# Patient Record
Sex: MALE | Race: WHITE | NOT HISPANIC OR LATINO | Employment: FULL TIME | URBAN - METROPOLITAN AREA
[De-identification: names, ages, dates, MRNs, and addresses within clinical notes are randomized per-mention and may not be internally consistent; named-entity substitution may affect disease eponyms.]

---

## 2018-07-06 ENCOUNTER — TRANSCRIBE ORDERS (OUTPATIENT)
Dept: ADMINISTRATIVE | Facility: HOSPITAL | Age: 38
End: 2018-07-06

## 2018-07-06 DIAGNOSIS — R20.0 NUMBNESS: Primary | ICD-10-CM

## 2018-07-06 DIAGNOSIS — R53.1 WEAKNESS: ICD-10-CM

## 2018-07-06 DIAGNOSIS — R74.8 ELEVATED CPK: ICD-10-CM

## 2018-07-11 ENCOUNTER — HOSPITAL ENCOUNTER (OUTPATIENT)
Dept: INFUSION THERAPY | Facility: HOSPITAL | Age: 38
Discharge: HOME | End: 2018-07-11
Attending: PHYSICAL MEDICINE & REHABILITATION | Admitting: PHYSICAL MEDICINE & REHABILITATION

## 2018-07-11 DIAGNOSIS — R74.8 ELEVATED CPK: ICD-10-CM

## 2018-07-11 DIAGNOSIS — R20.0 NUMBNESS: ICD-10-CM

## 2018-07-11 DIAGNOSIS — R53.1 WEAKNESS: ICD-10-CM

## 2018-07-11 PROCEDURE — 95909 NRV CNDJ TST 5-6 STUDIES: CPT

## 2018-07-11 PROCEDURE — 95886 MUSC TEST DONE W/N TEST COMP: CPT

## 2018-07-11 NOTE — PROCEDURES
HISTORY:     The patient is a 38-year-old male who presents with a complaint of numbness and tingling and intermittent muscle cramps of both lower extremities (right > left).  He states that he has been experiencing numbness and tingling since 2011.  He gives an 18-month history of muscle cramps.  He denies weakness.    The patient reports occasional pain in the low back region.  He has undergone 2 surgical procedures involving the thoracolumbar spine.  He underwent L5-S1 discectomy in 2011.  He underwent thoracic fusion in 2013.        I. NERVE CONDUCTION STUDIES:       The distal latency of the left peroneal motor nerve is 4.6 ms. The amplitude of evoked response is 4.4 mV. The conduction velocity is 44 m/sec.       The distal latency of the left tibial motor nerve is 4.3 ms. The amplitude of evoked response is 5.3 mV. The conduction velocity is 42 m/sec.      The distal latency of the left sural sensory nerve is 2.9 ms. The amplitude of evoked response is 12 microvolts (normal > 15 uV).       The distal latency of the right peroneal motor nerve is 4.2 ms. The amplitude of evoked response is 2.9 mV.  The conduction is 42 m/sec.      The distal latency of the right tibial motor nerve is 4.3 ms. The amplitude of evoked response is 5.9 mV. The conduction velocity is 45 m/sec.      The distal latency of the right sural sensory nerve is 2.8 ms. The amplitude of evoked response is 12 microvolts (normal > 15 uV).       II. ELECTROMYOGRAPHY:       Electromyography was performed on the following muscles of the right lower extremity using a monopolar needle: Vastus lateralis, tibialis anterior, peroneus longus, medial and lateral gastrocnemius, and bicep femoris. Because of her history of back surgery, the lumbar paraspinal muscles were not examined.      There were no changes in insertional activity. There were no denervation potentials present.       Increased polyphasic MUAPs were present in the right tibialis  anterior, medial gastrocnemius, and bicep femoris.     III. IMPRESSION:      1. Nerve conduction studies revealed a mild decrease in amplitude of the sural sensory evoked response bilaterally.  Otherwise, normal nerve conduction studies.       2. 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 There is no electrophysiologic evidence of an acute neuropathic process.      3. There is no electrophysiologic evidence of a myopathic process.       Carmen English M.D.*

## 2023-04-18 ENCOUNTER — OFFICE VISIT (OUTPATIENT)
Dept: BEHAVIORAL HEALTH | Facility: CLINIC | Age: 43
End: 2023-04-18
Payer: COMMERCIAL

## 2023-04-18 VITALS
BODY MASS INDEX: 38.05 KG/M2 | SYSTOLIC BLOOD PRESSURE: 134 MMHG | HEIGHT: 75 IN | HEART RATE: 100 BPM | DIASTOLIC BLOOD PRESSURE: 93 MMHG | WEIGHT: 306 LBS

## 2023-04-18 DIAGNOSIS — F41.0 GENERALIZED ANXIETY DISORDER WITH PANIC ATTACKS: ICD-10-CM

## 2023-04-18 DIAGNOSIS — F41.1 GENERALIZED ANXIETY DISORDER WITH PANIC ATTACKS: ICD-10-CM

## 2023-04-18 DIAGNOSIS — F33.1 MAJOR DEPRESSIVE DISORDER, RECURRENT EPISODE, MODERATE: Primary | ICD-10-CM

## 2023-04-18 PROBLEM — Z86.0100 HISTORY OF COLONIC POLYPS: Status: ACTIVE | Noted: 2020-08-19

## 2023-04-18 PROBLEM — F41.9 ANXIETY: Status: ACTIVE | Noted: 2021-12-20

## 2023-04-18 PROBLEM — F32.A DEPRESSIVE DISORDER: Status: ACTIVE | Noted: 2021-12-20

## 2023-04-18 PROBLEM — R73.9 HYPERGLYCEMIA: Status: ACTIVE | Noted: 2020-08-19

## 2023-04-18 PROBLEM — M54.50 LOW BACK PAIN: Status: ACTIVE | Noted: 2023-04-18

## 2023-04-18 PROBLEM — Z86.010 HISTORY OF COLONIC POLYPS: Status: ACTIVE | Noted: 2020-08-19

## 2023-04-18 PROBLEM — M79.609 PAIN IN SOFT TISSUES OF LIMB: Status: ACTIVE | Noted: 2023-04-18

## 2023-04-18 PROBLEM — E78.5 HYPERLIPIDEMIA: Status: ACTIVE | Noted: 2020-08-19

## 2023-04-18 PROCEDURE — 90792 PSYCH DIAG EVAL W/MED SRVCS: CPT | Performed by: NURSE PRACTITIONER

## 2023-04-18 RX ORDER — DULOXETIN HYDROCHLORIDE 30 MG/1
90 CAPSULE, DELAYED RELEASE ORAL DAILY
COMMUNITY
End: 2023-04-18 | Stop reason: SDUPTHER

## 2023-04-18 RX ORDER — CLONAZEPAM 0.5 MG/1
0.5 TABLET ORAL AS NEEDED
COMMUNITY

## 2023-04-18 RX ORDER — HYDROXYZINE HYDROCHLORIDE 25 MG/1
25 TABLET, FILM COATED ORAL DAILY
COMMUNITY
End: 2023-04-18 | Stop reason: SDUPTHER

## 2023-04-18 RX ORDER — DULOXETIN HYDROCHLORIDE 30 MG/1
90 CAPSULE, DELAYED RELEASE ORAL EVERY MORNING
Qty: 270 CAPSULE | Refills: 1 | Status: SHIPPED | OUTPATIENT
Start: 2023-04-18 | End: 2023-10-15

## 2023-04-18 RX ORDER — ATORVASTATIN CALCIUM 20 MG/1
1 TABLET, FILM COATED ORAL
COMMUNITY
Start: 2023-03-20

## 2023-04-18 RX ORDER — HYDROXYZINE HYDROCHLORIDE 25 MG/1
25 TABLET, FILM COATED ORAL 3 TIMES DAILY PRN
Qty: 90 TABLET | Refills: 1 | Status: SHIPPED | OUTPATIENT
Start: 2023-04-18

## 2023-04-18 RX ORDER — ASPIRIN 81 MG/1
81 TABLET ORAL
COMMUNITY
End: 2023-04-18

## 2023-04-18 RX ORDER — AMLODIPINE BESYLATE 5 MG/1
5 TABLET ORAL
COMMUNITY
Start: 2022-06-24 | End: 2023-06-24

## 2023-04-18 RX ORDER — FLUOXETINE HYDROCHLORIDE 40 MG/1
80 CAPSULE ORAL
COMMUNITY
End: 2023-04-18

## 2023-04-18 NOTE — PATIENT INSTRUCTIONS
"1. Should you want to get in touch with your provider, DELMY Rodriguez, please contact MY Medical Assistant, Carlita, directly at [phone].  Recommend saving Carlita's direct number in phone as this is the PREFERRED & EASIEST way to get in contact with your provider.  Please leave a voice mail if you do not get an answer and she will return your call within 24 hrs. You will NOT be able to contact provider on [facility], as Behavioral Health Providers are restricted. YOU MUST CALL [phone]    If you need to speak with the on call provider after hours or on weekends, please Contact the [facility] ([phone]) and staff will be able to page the provider on call directly.      2.  In the event you need to cancel an appointment, please notify the office at least 24 hrs prior:   Contact **Carlita Medical Assistant at [facility] directly at [phone] or the [facility] ([phone])       3, MEDICATION REFILLS:  PLEASE CALL THE PHARMACY TO REQUEST ALL MEDICATION REFILLS TO ENSURE YOU ARE RECEIVING YOUR MEDICATIONS IN A TIMELY MANNER. The pharmacy will send this request ELECTRONICALLY to the ordering provider.     IF YOU USE AN AUTOMATED SERVICE AT THE PHARMACY FOR REFILLS AND ARE TOLD THERE ARE \"NO REFILLS REMAINING\"   PLEASE CALL THE PHARMACY & SPEAK TO A LIVE PERSON TO VERIFY IT IS THE MOST UP TO DATE PRESCRIPTION ON FILE.    All new prescriptions will have a different number, therefore, if you were given refills for a medication today or at last visit it will not have the same number as the previous prescription.       4.  In the event you have personal crisis, contact the following crisis numbers: Suicide Prevention Hotline 1-619.125.4285 or *988, [facility] Helpline 6-971-358-NERIS; [facility] Emergency Room [phone]; text HELLO to 071862; or 510.      5. We would appreciate your feedback, please scan the QRS code on the back of your appointment card (or see below) and complete " "a brief survey.  [place] location is still not available, so please click \"[place]\" location.  Thank you      SPECIFIC RECOMMENDATIONS:     1.      Medications discussed at this encounter:                   - STOP Zoloft (SERTRALINE) you may experience some irritability, nausea, diarrhea due to stopping, though should subside within a few days.     Removed Prozac from medication profile    Change Hydroxyzine from Vistaril(capsule you have at home) TO tablet form 25 mg by mouth 3 times daily as needed anxiety. Instructed to not take any other Antihistamine/Allergy medication such as but not limited to:  Zyrtec, Claritin, Allegra, or Benadryl within 4 hrs of taking Hydroxyzine.  *Filled today     Start keeping some of the Hydroxyzine in your truck to help with anxiety if needed instead of Klonopin.     Continue Cymbalta 90 mg by mouth daily in the morning, *refilled today.      2.      Psychotherapy recommendations: Continue with current therapist. Declined     3.     Return to clinic: 6 weeks    Coping mechanisms discussed with patient today as a way to gain control over feelings to prevent situation or panic attack from getting worse: grounding techniques using 5 senses (name 3 things you can see, smell, touch, etc.), slow paced breathing techniques- focus on door inhaling and exhaling at each corner, application of cold compress/ice pack/water on face or opening freezer door to allow cold air to be breathed in taking slow deep breaths, closing eyes and focus on positive thoughts.     Please arrive at least 15 minutes before your scheduled appointment time to complete check in process.      IF you are scheduled for a [facility] VIDEO visit, YOU MUST COMPLETE THE \"E-CHECK IN\" PROCESS PRIOR TO BEGINNING THE VISIT, YOU WILL NO LONGER RECEIVE A PHONE CALL PRIOR TO ALL VIDEO VISITS          "

## 2023-04-18 NOTE — PROGRESS NOTES
"Subjective   Naldo Womack is a 43 y.o. male who presents today for initial evaluation     Referring Provider:  Torrie Soriano PA  [address]  [address] [place] [address]    Chief Complaint:  Depression and anxiety    History of Present Illness:  Patient presents today in office with a history of anxiety and depression for which treatment began approximately 20 yrs ago.  Patient is currently prescribed Cymbalta 90 mg, Zoloft 50 mg, and Hydroxyzine (Vistaril) 25 mg daily, which have provided effectiveness.  Patient has a PMHX of Hypertension, Migraines, ANGELICA with use of CPAP, hypogonadism, ED, prostate disorder, Arthritis, gout, lumbar back surgery, peripheral neuropathy (BLE) and allergic rhinitis.    Patient goal of treatment \"just to feel somewhat normal, and have some emotion, right now I feel I am just here.\"    Patient was with an [facility] provider for approximately 1.5 yrs, last seen via video approximately 1 month ago, was not happy with care due to frequent medication changes.  As patient reports taking 4 different medications at the same time, and is unable to determine their effectiveness.     Patient reports having thoughts of a truck hitting him while driving, feels worthless at times.  Denies suicidal planning, thoughts of action plan, or rumination.  \"That family would be better off dead, negative thoughts start to spiral through, and over thinks meaning how much better would my family be if this truck lost control.\"  Once patient is around his 3 kids or is busy at work he is able to alleviate negative thoughts.  These thoughts have occurred once every several months.      Wife tells patient he is emotionless, which has been going on for approximately 6-8 months.  Patient recalls while family was bowling, family was having fun, showing emotion, though patient had no expression or emotion to surroundings.      Patient is certain he is taking Cymbalta 90 mg in the morning, Hydroxyzine 25 mg daily " "in the morning is allowed to take up to 3 times daily,  Zoloft 50 mg in the morning.  The Klonopin 0.5 mg was filled 22 for 15 tabs, and last took yesterday due to elevated stress and prior was at least 2-3 months.  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      In regards to anxiety, \"it is out of nowhere\", will feel discomfort to chest, anxiousness, feeling soa, which is typically felt after arrival to work which is approximately 10 minutes after arising. Though has felt symptoms in the past without being work related.  Patient unable to identify specific triggers of anxiety.  Patient will try to calm self by listening to music alone in truck, distractions which seems to help if anxiety is not as bad.   And if not relieved will only take half of the Klonopin 0.5 mg which is effective, as patient try's to not take unless absolutely necessary. Patient believes he has 8-9 tabs of Klonopin remaining.  Patient denies use of as needed Hydroxyzine in place of Klonopin in the past.     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 Patient does suffer from arthritis. At times patient does experience blurred vision, which is intermittent. Denies use of prescription lenses or contacts or recent eye exam. Left hand does shake with fine motor skills such as writing, which patient recently noticed over the last few months.     Cymbalta for approximately 3 yrs, had been effective, however, per pharmacy record patient provided today, dose was increased from 60 mg to 90 mg 22.  And Zoloft 50 mg started 22.  Nerve pain to ble has been managed well with Cymbalta.  Patient occasionally takes another antihistamine a few weeks ago due to allergy season, otherwise does not take " routinely or past start of spring.  Denies difficulty with sleep since starting using CPAP.  Denies day time drowsiness.  Confirmed patient is no longer taking Prozac, Rexulti, Buspar, or Effexor XR.      Depression: Patient complains of depression. He complains of anhedonia, depressed mood, difficulty concentrating, feelings of worthlessness/guilt and appetite changes.        Anxiety: The patient endorses significant symptoms of anxiety including: anxioiusness, feeling on edge, , restlessness or feeling keyed up, being easily fatigued, difficulty concentrating or mind going blank, irritability, muscle tension and sleep disturbance which have caused impairment in important areas of daily functioning.  The patient has had symptoms of anxiety for over 20 yrs, which have worsened over the last 8-9 months.       PHQ-9 Depression Screening  PHQ-9 Total Score: 7    Little interest or pleasure in doing things? 1-->several days   Feeling down, depressed, or hopeless? 1-->several days   Trouble falling or staying asleep, or sleeping too much? 0-->not at all   Feeling tired or having little energy? 0-->not at all   Poor appetite or overeating? 1-->several days   Feeling bad about yourself - or that you are a failure or have let yourself or your family down? 1-->several days   Trouble concentrating on things, such as reading the newspaper or watching television? 1-->several days   Moving or speaking so slowly that other people could have noticed? Or the opposite - being so fidgety or restless that you have been moving around a lot more than usual? 1-->several days   Thoughts that you would be better off dead, or of hurting yourself in some way? 1-->several days   PHQ-9 Total Score 7     BERNICE-7  Feeling nervous, anxious or on edge: Several days  Not being able to stop or control worrying: Several days  Worrying too much about different things: Several days  Trouble Relaxing: Several days  Being so restless that it is hard to sit  still: Several days  Feeling afraid as if something awful might happen: Not at all  Becoming easily annoyed or irritable: Several days  BERNICE 7 Total Score: 6  If you checked any problems, how difficult have these problems made it for you to do your work, take care of things at home, or get along with other people: Very difficult    Past Surgical History:  Past Surgical History:   Procedure Laterality Date   • BACK SURGERY      x 2       Problem List:  Patient Active Problem List   Diagnosis   • Anxiety   • Depressive disorder   • History of colonic polyps   • Hyperglycemia   • Hyperlipidemia   • Low back pain   • Pain in soft tissues of limb   • Thoracic spondylosis with myelopathy       Allergy:   No Known Allergies     Discontinued Medications:  Medications Discontinued During This Encounter   Medication Reason   • aspirin 81 MG EC tablet *Therapy completed   • FLUoxetine (PROzac) 40 MG capsule Historical Med - Therapy completed   • sertraline (ZOLOFT) 50 MG tablet Drug interaction   • DULoxetine (CYMBALTA) 30 MG capsule Reorder   • hydrOXYzine (ATARAX) 25 MG tablet Reorder       Current Medications:   Current Outpatient Medications   Medication Sig Dispense Refill   • amLODIPine (NORVASC) 5 MG tablet Take 1 tablet by mouth.     • atorvastatin (LIPITOR) 20 MG tablet Take 1 tablet by mouth every night at bedtime.     • clonazePAM (KlonoPIN) 0.5 MG tablet Take 1 tablet by mouth As Needed for Anxiety (for Anxiety attacks).     • DULoxetine (CYMBALTA) 30 MG capsule Take 3 capsules by mouth Every Morning for 180 days. Indications: Disease of the Peripheral Nerves, Generalized Anxiety Disorder, Major Depressive Disorder 270 capsule 1   • hydrOXYzine (ATARAX) 25 MG tablet Take 1 tablet by mouth 3 (Three) Times a Day As Needed for Anxiety. Indications: Feeling Anxious 90 tablet 1     No current facility-administered medications for this visit.       Past Medical History:  Past Medical History:   Diagnosis Date   • Anxiety   "  • Chronic pain disorder    • Depression    • Obsessive-compulsive disorder    • Panic disorder    • Peripheral neuropathy        Past Psychiatric History:  Began Treatment:20 yrs ago prior to getting , though dealt with symptoms since childhood  Diagnoses:Depression, Anxiety and OCD, panic disorder  Psychiatrist:DELMY Alamo with [facility] 1.5 yrs, last seen 3/2022  Therapist:2- one had about 6 visits approx 15 yrs ago; and 8 yrs ago seen a lady in [place]  Admission History:Denies  Medication Trials:several changes while at [facility], \"certain one's i took in the morning and i couldn't get out of truck, like i was froze, worrying\"added several meds to Cymbalta; Effexor XR, Buspar was unable to keep up with overlapping med orders; Rexulti, Prozac, and Zoloft (9/2022-4/18/23 stopped due to risk of serotonin syndrome wihile taking with Cymbalta)  Self Harm: Denies  Suicide Attempts:Denies      Substance Abuse History:   Types:Denies all, including illicit  Withdrawal Symptoms:Denies  Longest Period Sober:Not Applicable   AA: Not applicable     Social History:  Martial Status:  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, where [employer] [place] [employer]   Kids:Yes or If so, how many 3- age 13,10, and 7  House:Lives in a house   History: Denies  Access to Guns:  Yes, locked in safe    Social History     Socioeconomic History   • Marital status:    Tobacco Use   • Smoking status: Never   • Smokeless tobacco: Current     Types: Snuff   Vaping Use   • Vaping Use: Never used   Substance and Sexual Activity   • Drug use: Never     Comment: only anything ever prescribed (but did one time fail drug test due to CBD drops not using currently)   • Sexual activity: Yes       Family History:   Suicide Attempts: Denies  Suicide Completions:Denies      Family History   Problem Relation Age of Onset   • Depression Mother    • Anxiety disorder Mother    • Alcohol abuse Father    • Alcohol abuse Sister    • Alcohol abuse " Brother        Developmental History:   Born: [place]  Siblings:3 sisters, 1 brother  Childhood: Denies Abuse  High School:Completed  College:[employer] degree    Mental Status Exam:   Hygiene:   good  Cooperation:  Cooperative  Eye Contact:  Good  Psychomotor Behavior:  Appropriate  Affect:  Appropriate  Mood: depressed and anxious  Speech:  Normal  Thought Process:  Goal directed  Thought Content:  Mood congruent  Suicidal:  None  Homicidal:  None  Hallucinations:  None  Delusion:  None  Memory:  Intact  Orientation:  Grossly intact  Reliability:  good  Insight:  Good  Judgement:  Good  Impulse Control:  Good  Physical/Medical Issues:  Yes Hypertension, Migraines, ANGELICA with CPAP, peripheral neuropathy, ED, Hypogonadism, prostate disorder, Arthritis, gout, lumbar back surgery, and allergic rhinitis.     Review of Systems:  Review of Systems   Constitutional: Positive for appetite change. Negative for fatigue.   HENT: Negative for trouble swallowing.    Respiratory: Positive for apnea. Negative for cough and shortness of breath.         CPAP   Cardiovascular: Negative for chest pain and palpitations.   Gastrointestinal: Negative for diarrhea and nausea.   Musculoskeletal: Positive for arthralgias and back pain.   Neurological: Positive for numbness. Negative for seizures.        Ble peripheral neuropathy   Psychiatric/Behavioral: Positive for decreased concentration. Negative for agitation, confusion, hallucinations, self-injury, sleep disturbance and suicidal ideas. The patient is nervous/anxious. The patient is not hyperactive.          Physical Exam:  Physical Exam  Psychiatric:         Attention and Perception: Attention and perception normal.         Mood and Affect: Affect normal. Mood is anxious and depressed.         Speech: Speech normal.         Behavior: Behavior normal. Behavior is cooperative.         Thought Content: Thought content normal. Thought content does not include suicidal ideation.  "Thought content does not include suicidal plan.         Cognition and Memory: Cognition and memory normal.         Judgment: Judgment normal.         Vital Signs:   /93   Pulse 100   Ht 190.5 cm (75\")   Wt (!) 139 kg (306 lb)   BMI 38.25 kg/m²      Lab Results:   No visits with results within 6 Month(s) from this visit.   Latest known visit with results is:   No results found for any previous visit.       EKG Results:  No orders to display       Imaging Results:  No Images in the past 120 days found..      Assessment & Plan   Diagnoses and all orders for this visit:    1. Major depressive disorder, recurrent episode, moderate (Primary)  -     DULoxetine (CYMBALTA) 30 MG capsule; Take 3 capsules by mouth Every Morning for 180 days. Indications: Disease of the Peripheral Nerves, Generalized Anxiety Disorder, Major Depressive Disorder  Dispense: 270 capsule; Refill: 1    2. Generalized anxiety disorder with panic attacks  -     hydrOXYzine (ATARAX) 25 MG tablet; Take 1 tablet by mouth 3 (Three) Times a Day As Needed for Anxiety. Indications: Feeling Anxious  Dispense: 90 tablet; Refill: 1  -     DULoxetine (CYMBALTA) 30 MG capsule; Take 3 capsules by mouth Every Morning for 180 days. Indications: Disease of the Peripheral Nerves, Generalized Anxiety Disorder, Major Depressive Disorder  Dispense: 270 capsule; Refill: 1        Visit Diagnoses:    ICD-10-CM ICD-9-CM   1. Major depressive disorder, recurrent episode, moderate  F33.1 296.32   2. Generalized anxiety disorder with panic attacks  F41.1 300.02    F41.0 300.01       PLAN:  1. Safety: No acute safety concerns  2. Therapy: Declines due to inconsistent work schedule  3. Risk Assessment: Risk of self-harm acutely is moderate.  Risk factors include anxiety disorder, mood disorder, access to guns/weapons.  Protective factors include no family history, no present SI, no history of suicide attempts or self-harm in the past, minimal AODA, healthcare seeking, " future orientation, willingness to engage in care.  Risk of self-harm chronically is also moderate, but could be further elevated in the event of treatment noncompliance and/or AODA.  4. Meds:  Continue Cymbalta (Duloxetine) 90 mg by mouth daily in the morning to target depression and anxiety as well as peripheral neuropathy.   Discussed all risks, benefits, alternatives, and side effects of Duloxetine (Cymbalta) including but not limited to GI upset, sexual dysfunction, bleeding risk, seizure risk, weight loss, insomnia, diaphoresis, drowsiness, headache, dizziness, fatigue, activation of kimmie or hypomania, increased fragility fracture risk, hyponatremia, increased BP, hepatotoxicity, ocular effects, withdrawal syndrome following abrupt discontinuation, serotonin syndrome, and activation of suicidal ideation and behavior.   Discussed the need for patient to immediately call the office for any new or worsening symptoms, such as worsening depression; feeling nervous or restless; suicidal thoughts or actions; or other changes in mood or behavior, and all other concerns. Patient educated on medication compliance and the risks of suddenly stopping this medication or missing doses. Patient verbalized understanding and is agreeable to taking Duloxetine. Addressed all questions and concerns.    Change Hydroxyzine (Vistaril) 25 mg by mouth daily TO Hydroxyzine (Atarax) 25 mg by mouth 3 times daily as needed to target anxiety, panic attacks due to increased risks of sedation with Vistaril.  Instructed to not take any other Antihistamine/Allergy medication such as but not limited to:  Zyrtec, Claritin, Allegra, or Benadryl within 4 hrs of taking Hydroxyzine.  Risks, benefits, alternatives discussed with patient including sedation, dizziness, fall risk, GI upset, and risk of increased CNS depression and elevated heart rate if taken with other antihistamines.  After discussion of these risks and benefits, the patient voiced  understanding and agreed to proceed. Instructed patient to start keeping a small supply of Hydroxyzine in truck instead of Klonopin, which patient reported rare use.  Advised to no longer take Klonopin if Hydroxyzine effective along with coping strategies discussed today.     Stop Zoloft due to risks of serotonin syndrome in combination with Cymbalta, informed patient he may experience increased irritability and GI discomfort which should subside in a few days. If symptoms fail to improve contact provider.     Removed Prozac from medication profile as patient has not taken for some time.     Patient instructed to bring AVS with current medication list to pharmacy upon picking up Cymbalta and Hydroxyzine for accuracy. Highlighted area to show pharmacy.     5. Labs: n/a  6. Coping mechanisms discussed with patient today as a way to gain control over feelings to prevent situation or panic attack from getting worse: grounding techniques using 5 senses (name 3 things you can see, smell, touch, etc.), slow paced breathing techniques- focus on door inhaling and exhaling at each corner, application of cold compress/ice pack/water on face or opening freezer door to allow cold air to be breathed in taking slow deep breaths, closing eyes and focus on positive thoughts.      Patient presentation seems most consistent with depression, anxiety, and panic attacks.  Reviewed medication dispense record from [facility] pharmacy 1/1/21-12/31/22, reconciled medications.   Patient to contact provider if symptoms worsen or fail to improve.      Patient screened positive for depression based on a PHQ-9 score of 7 on 4/18/2023. Follow-up recommendations include: Prescribed antidepressant medication treatment and Suicide Risk Assessment performed.         TREATMENT PLAN/GOALS: Continue supportive psychotherapy efforts and medications as indicated. Treatment and medication options discussed during today's visit. Patient acknowledged and verbally  consented to continue with current treatment plan and was educated on the importance of compliance with treatment and follow-up appointments.    MEDICATION ISSUES:  HANSEL reviewed as expected.  Discussed medication options and treatment plan of prescribed medication as well as the risks, benefits, and side effects including potential falls, possible impaired driving and metabolic adversities among others. Patient is agreeable to call the office with any worsening of symptoms or onset of side effects. Patient is agreeable to call 911 or go to the nearest ER should he/she begin having SI/HI. No medication side effects or related complaints today.     MEDS ORDERED DURING VISIT:  New Medications Ordered This Visit   Medications   • hydrOXYzine (ATARAX) 25 MG tablet     Sig: Take 1 tablet by mouth 3 (Three) Times a Day As Needed for Anxiety. Indications: Feeling Anxious     Dispense:  90 tablet     Refill:  1   • DULoxetine (CYMBALTA) 30 MG capsule     Sig: Take 3 capsules by mouth Every Morning for 180 days. Indications: Disease of the Peripheral Nerves, Generalized Anxiety Disorder, Major Depressive Disorder     Dispense:  270 capsule     Refill:  1     Patient is no longer taking Zoloft       Return in about 6 weeks (around 5/30/2023) for medication check.         I spent 78 minutes caring for Naldo on this date of service. This time includes time spent by me in the following activities: preparing for the visit, reviewing tests, obtaining and/or reviewing a separately obtained history, performing a medically appropriate examination and/or evaluation, counseling and educating the patient/family/caregiver, ordering medications, tests, or procedures, referring and communicating with other health care professionals, documenting information in the medical record and care coordination.      This document has been electronically signed by DELMY Rodriguez  April 18, 2023 11:49 EDT      Part of this note may be an  electronic transcription/translation of spoken language to printed text using the Dragon Dictation System.

## 2023-05-30 ENCOUNTER — OFFICE VISIT (OUTPATIENT)
Dept: BEHAVIORAL HEALTH | Facility: CLINIC | Age: 43
End: 2023-05-30

## 2023-05-30 VITALS
SYSTOLIC BLOOD PRESSURE: 151 MMHG | HEART RATE: 73 BPM | BODY MASS INDEX: 37.28 KG/M2 | WEIGHT: 299.8 LBS | HEIGHT: 75 IN | DIASTOLIC BLOOD PRESSURE: 86 MMHG

## 2023-05-30 DIAGNOSIS — F41.0 GENERALIZED ANXIETY DISORDER WITH PANIC ATTACKS: ICD-10-CM

## 2023-05-30 DIAGNOSIS — F41.1 GENERALIZED ANXIETY DISORDER WITH PANIC ATTACKS: ICD-10-CM

## 2023-05-30 DIAGNOSIS — F33.1 MAJOR DEPRESSIVE DISORDER, RECURRENT EPISODE, MODERATE: Primary | ICD-10-CM

## 2023-05-30 PROCEDURE — 99214 OFFICE O/P EST MOD 30 MIN: CPT | Performed by: NURSE PRACTITIONER

## 2023-05-30 NOTE — PROGRESS NOTES
"Subjective   Naldo Womack is a 43 y.o. male who presents today for follow up    Referring Provider:  Torrie Soriano PA  [address]  [place] [address]    Chief Complaint:  Depression and anxiety, panic attacks    History of Present Illness:    5/30/23:  Patient presents today in office, \"The first 3 weeks coming off that medicine I had angry spells, up and down, but the last 2-3 days I have been crying for no reason.\" Patient was taken off Zoloft 50 mg at last visit due to also taking Cymbalta 90 mg.     At last visit Hydroxyzine (Vistaril) was changed from 25 mg by mouth daily TO Hydroxyzine (Atarax) 25 mg by mouth 3 times daily as needed to target anxiety, panic attacks due to increased risks of sedation with Vistaril.  Patient was also instructed patient to start keeping a small supply of Hydroxyzine in truck instead of Klonopin, which patient reported rare use.  Advised to no longer take Klonopin if Hydroxyzine effective along with coping strategies discussed today.   Patient has not required use as as needed, and takes daily in the morning,and keeps extra in vehicle which patient has not had to take.  Denies drowsiness.     Panic attacks: denies  Patient weight noted with a decrease of 7 lbs, however, patient reports weight fluctuates often, and believes prior weight may have been up due to work clothes and pockets with items.     Patient no longer having anger spells, currently feelings of emptiness. Denies external stressors or changes.  This past Saturday went out with family, and Sunday night went to a concert, which patient found guillermo.  \"It comes out of nowhere, it hits me all at once.\"  Patient will try to distract self rather than dwell, though sometimes unable to distract.  Reports more sadness, hopeless feeling, and starts to cry, rather than anxiousness.        4/18/23:  INITIAL EVAL  Patient presents today in office with a history of anxiety and depression for which treatment " "began approximately 20 yrs ago.  Patient is currently prescribed Cymbalta 90 mg, Zoloft 50 mg, and Hydroxyzine (Vistaril) 25 mg daily, which have provided effectiveness.  Patient has a PMHX of Hypertension, Migraines, ANGELICA with use of CPAP, hypogonadism, ED, prostate disorder, Arthritis, gout, lumbar back surgery, peripheral neuropathy (BLE) and allergic rhinitis.    Patient goal of treatment \"just to feel somewhat normal, and have some emotion, right now I feel I am just here.\"    Patient was with an [facility] provider for approximately 1.5 yrs, last seen via video approximately 1 month ago, was not happy with care due to frequent medication changes.  As patient reports taking 4 different medications at the same time, and is unable to determine their effectiveness.     Patient reports having thoughts of a truck hitting him while driving, feels worthless at times.  Denies suicidal planning, thoughts of action plan, or rumination.  \"That family would be better off dead, negative thoughts start to spiral through, and over thinks meaning how much better would my family be if this truck lost control.\"  Once patient is around his 3 kids or is busy at work he is able to alleviate negative thoughts.  These thoughts have occurred once every several months.      Wife tells patient he is emotionless, which has been going on for approximately 6-8 months.  Patient recalls while family was bowling, family was having fun, showing emotion, though patient had no expression or emotion to surroundings.      Patient is certain he is taking Cymbalta 90 mg in the morning, Hydroxyzine 25 mg daily in the morning is allowed to take up to 3 times daily,  Zoloft 50 mg in the morning.  The Klonopin 0.5 mg was filled 22 for 15 tabs, and last took yesterday due to elevated stress and prior was at least 2-3 months.  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      In regards to anxiety, \"it is out of nowhere\", will feel discomfort to chest, anxiousness, feeling soa, which is typically felt after arrival to work which is approximately 10 minutes after arising. Though has felt symptoms in the past without being work related.  Patient unable to identify specific triggers of anxiety.  Patient will try to calm self by listening to music alone in truck, distractions which seems to help if anxiety is not as bad.   And if not relieved will only take half of the Klonopin 0.5 mg which is effective, as patient try's to not take unless absolutely necessary. Patient believes he has 8-9 tabs of Klonopin remaining.  Patient denies use of as needed Hydroxyzine in place of Klonopin in the past.     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 Patient does suffer from arthritis. At times patient does experience blurred vision, which is intermittent. Denies use of prescription lenses or contacts or recent eye exam. Left hand does shake with fine motor skills such as writing, which patient recently noticed over the last few months.     Cymbalta for approximately 3 yrs, had been effective, however, per pharmacy record patient provided today, dose was increased from 60 mg to 90 mg 8/30/22.  And Zoloft 50 mg started 9/19/22.  Nerve pain to ble has been managed well with Cymbalta.  Patient occasionally takes another antihistamine a few weeks ago due to allergy season, otherwise does not take routinely or past start of spring.  Denies difficulty with sleep since starting using CPAP.  Denies day time drowsiness.  Confirmed patient is no longer taking Prozac, Rexulti, Buspar, or Effexor XR.      Depression: Patient complains of depression. He complains of anhedonia, depressed mood, difficulty concentrating, feelings of worthlessness/guilt " and appetite changes.        Anxiety: The patient endorses significant symptoms of anxiety including: anxioiusness, feeling on edge, , restlessness or feeling keyed up, being easily fatigued, difficulty concentrating or mind going blank, irritability, muscle tension and sleep disturbance which have caused impairment in important areas of daily functioning.  The patient has had symptoms of anxiety for over 20 yrs, which have worsened over the last 8-9 months.       PHQ-9 Depression Screening  PHQ-9 Total Score:   4/18/2023 7 , reassess 08/2023    Little interest or pleasure in doing things?     Feeling down, depressed, or hopeless?     Trouble falling or staying asleep, or sleeping too much?     Feeling tired or having little energy?     Poor appetite or overeating?     Feeling bad about yourself - or that you are a failure or have let yourself or your family down?     Trouble concentrating on things, such as reading the newspaper or watching television?     Moving or speaking so slowly that other people could have noticed? Or the opposite - being so fidgety or restless that you have been moving around a lot more than usual?     Thoughts that you would be better off dead, or of hurting yourself in some way?     PHQ-9 Total Score       BERNICE-7    4/18/2023  6, reassess 08/2023    Past Surgical History:  Past Surgical History:   Procedure Laterality Date   • BACK SURGERY      x 2       Problem List:  Patient Active Problem List   Diagnosis   • Anxiety   • Depressive disorder   • History of colonic polyps   • Hyperglycemia   • Hyperlipidemia   • Low back pain   • Pain in soft tissues of limb   • Thoracic spondylosis with myelopathy       Allergy:   No Known Allergies     Discontinued Medications:  There are no discontinued medications.    Current Medications:   Current Outpatient Medications   Medication Sig Dispense Refill   • amLODIPine (NORVASC) 5 MG tablet Take 1 tablet by mouth.     • atorvastatin (LIPITOR) 20 MG  "tablet Take 1 tablet by mouth every night at bedtime.     • clonazePAM (KlonoPIN) 0.5 MG tablet Take 1 tablet by mouth As Needed for Anxiety (for Anxiety attacks).     • DULoxetine (CYMBALTA) 30 MG capsule Take 3 capsules by mouth Every Morning for 180 days. Indications: Disease of the Peripheral Nerves, Generalized Anxiety Disorder, Major Depressive Disorder 270 capsule 1   • hydrOXYzine (ATARAX) 25 MG tablet Take 1 tablet by mouth 3 (Three) Times a Day As Needed for Anxiety. Indications: Feeling Anxious 90 tablet 1     No current facility-administered medications for this visit.       Past Medical History:  Past Medical History:   Diagnosis Date   • Anxiety    • Chronic pain disorder    • Depression    • Obsessive-compulsive disorder    • Panic disorder    • Peripheral neuropathy        Past Psychiatric History:  Began Treatment:20 yrs ago prior to getting , though dealt with symptoms since childhood  Diagnoses:Depression, Anxiety and OCD, panic disorder  Psychiatrist:DELMY Alamo with [facility] 1.5 yrs, last seen 3/2022  Therapist:2- one had about 6 visits approx 15 yrs ago; and 8 yrs ago seen a lady in [place]  Admission History:Denies  Medication Trials:several changes while at [facility], \"certain one's i took in the morning and i couldn't get out of truck, like i was froze, worrying\"added several meds to Cymbalta; Effexor XR, Buspar was unable to keep up with overlapping med orders; Rexulti, Prozac, and Zoloft (9/2022-4/18/23 stopped due to risk of serotonin syndrome wihile taking with Cymbalta)  Self Harm: Denies  Suicide Attempts:Denies      Substance Abuse History:   Types:Denies all, including illicit  Withdrawal Symptoms:Denies  Longest Period Sober:Not Applicable   AA: Not applicable     Social History:  Martial Status:  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, where [employer] [place] [employer]   Kids:Yes or If so, how many 3- age 13,10, and 7  House:Lives in a house   History: Denies  Access to " Guns:  Yes, locked in safe    Social History     Socioeconomic History   • Marital status:    Tobacco Use   • Smoking status: Never   • Smokeless tobacco: Current     Types: Snuff   Vaping Use   • Vaping Use: Never used   Substance and Sexual Activity   • Drug use: Never     Comment: only anything ever prescribed (but did one time fail drug test due to CBD drops not using currently)   • Sexual activity: Yes       Family History:   Suicide Attempts: Denies  Suicide Completions:Denies      Family History   Problem Relation Age of Onset   • Depression Mother    • Anxiety disorder Mother    • Alcohol abuse Father    • Alcohol abuse Sister    • Alcohol abuse Brother        Developmental History:   Born: [place]  Siblings:3 sisters, 1 brother  Childhood: Denies Abuse  High School:Completed  College:[employer] degree    Mental Status Exam:   Hygiene:   good  Cooperation:  Cooperative  Eye Contact:  Good  Psychomotor Behavior:  Appropriate  Affect:  Appropriate  Mood: depressed and anxious  Speech:  Normal  Thought Process:  Goal directed  Thought Content:  Mood congruent  Suicidal:  None  Homicidal:  None  Hallucinations:  None  Delusion:  None  Memory:  Intact  Orientation:  Grossly intact  Reliability:  good  Insight:  Good  Judgement:  Good  Impulse Control:  Good  Physical/Medical Issues:  Yes Hypertension, Migraines, ANGELICA with CPAP, peripheral neuropathy, ED, Hypogonadism, prostate disorder, Arthritis, gout, lumbar back surgery, and allergic rhinitis.     Review of Systems:  Review of Systems   Constitutional: Positive for appetite change. Negative for fatigue.   HENT: Negative for trouble swallowing.    Respiratory: Positive for apnea. Negative for cough and shortness of breath.         CPAP   Cardiovascular: Negative for chest pain and palpitations.   Gastrointestinal: Negative for diarrhea and nausea.   Musculoskeletal: Positive for arthralgias and back pain.   Neurological: Positive for numbness.  "Negative for seizures.        Ble peripheral neuropathy   Psychiatric/Behavioral: Positive for decreased concentration. Negative for agitation, confusion, hallucinations, self-injury, sleep disturbance and suicidal ideas. The patient is nervous/anxious. The patient is not hyperactive.          Physical Exam:  Physical Exam  Psychiatric:         Attention and Perception: Attention and perception normal.         Mood and Affect: Affect normal. Mood is anxious and depressed.         Speech: Speech normal.         Behavior: Behavior normal. Behavior is cooperative.         Thought Content: Thought content normal. Thought content does not include suicidal ideation. Thought content does not include suicidal plan.         Cognition and Memory: Cognition and memory normal.         Judgment: Judgment normal.         Vital Signs:   /86   Pulse 73   Ht 190.5 cm (75\")   Wt 136 kg (299 lb 12.8 oz)   BMI 37.47 kg/m²      Lab Results:   No visits with results within 6 Month(s) from this visit.   Latest known visit with results is:   No results found for any previous visit.       EKG Results:  No orders to display       Imaging Results:  No Images in the past 120 days found..      Assessment & Plan   Diagnoses and all orders for this visit:    1. Major depressive disorder, recurrent episode, moderate (Primary)    2. Generalized anxiety disorder with panic attacks        Visit Diagnoses:    ICD-10-CM ICD-9-CM   1. Major depressive disorder, recurrent episode, moderate  F33.1 296.32   2. Generalized anxiety disorder with panic attacks  F41.1 300.02    F41.0 300.01       PLAN:  1. Safety: No acute safety concerns  2. Therapy: Declines due to inconsistent work schedule  3. Risk Assessment: Risk of self-harm acutely is moderate.  Risk factors include anxiety disorder, mood disorder, access to guns/weapons.  Protective factors include no family history, no present SI, no history of suicide attempts or self-harm in the past, " minimal AODA, healthcare seeking, future orientation, willingness to engage in care.  Risk of self-harm chronically is also moderate, but could be further elevated in the event of treatment noncompliance and/or AODA.  4. Meds:  Continue Cymbalta (Duloxetine) 90 mg by mouth daily in the morning to target depression and anxiety as well as peripheral neuropathy.   Discussed all risks, benefits, alternatives, and side effects of Duloxetine (Cymbalta) including but not limited to GI upset, sexual dysfunction, bleeding risk, seizure risk, weight loss, insomnia, diaphoresis, drowsiness, headache, dizziness, fatigue, activation of kimmie or hypomania, increased fragility fracture risk, hyponatremia, increased BP, hepatotoxicity, ocular effects, withdrawal syndrome following abrupt discontinuation, serotonin syndrome, and activation of suicidal ideation and behavior.   Discussed the need for patient to immediately call the office for any new or worsening symptoms, such as worsening depression; feeling nervous or restless; suicidal thoughts or actions; or other changes in mood or behavior, and all other concerns. Patient educated on medication compliance and the risks of suddenly stopping this medication or missing doses. Patient verbalized understanding and is agreeable to taking Duloxetine. Addressed all questions and concerns.    Continue Hydroxyzine (Atarax) 25 mg by mouth 3 times daily as needed to target anxiety, panic attacks. Instructed to not take any other Antihistamine/Allergy medication such as but not limited to:  Zyrtec, Claritin, Allegra, or Benadryl within 4 hrs of taking Hydroxyzine.  Risks, benefits, alternatives discussed with patient including sedation, dizziness, fall risk, GI upset, and risk of increased CNS depression and elevated heart rate if taken with other antihistamines.  After discussion of these risks and benefits, the patient voiced understanding and agreed to proceed.  Denies drowsiness with  once daily dose every morning, has not required more than once daily.    5. Labs: n/a    Patient instructed to Start tracking symptoms with date, time, symptoms experiencing, potential cause-what were you doing at the time of the sudden symptoms you experienced if you can recall.  How were you able to stop symptoms? And have wife check blood pressure at least once weekly.     Symptoms of anxiety, panic attacks, and depression are under fair control with current medication regimen.  Due to duration of current symptoms expressed today and elevated blood pressure, will have patient start tracking symptoms, have wife check blood pressure at least once weekly and report with next visit.  Patient advised to contact provider if symptoms are occurring more often than not, as we discussed adding Cymbalta 30 mg nightly, though would like to see blood pressure more stable and symptom tracking before proceeding, as patient was informed if blood pressure did continue to elevate would have to stop the 30 mg dose.   Patient to contact provider if symptoms worsen or fail to improve.       4/18/23:   Safety: No acute safety concerns  Therapy: Declines due to inconsistent work schedule  Continue Cymbalta (Duloxetine) 90 mg by mouth daily in the morning to target depression and anxiety as well as peripheral neuropathy.   Discussed all risks, benefits, alternatives, and side effects of Duloxetine (Cymbalta) including but not limited to GI upset, sexual dysfunction, bleeding risk, seizure risk, weight loss, insomnia, diaphoresis, drowsiness, headache, dizziness, fatigue, activation of kimmie or hypomania, increased fragility fracture risk, hyponatremia, increased BP, hepatotoxicity, ocular effects, withdrawal syndrome following abrupt discontinuation, serotonin syndrome, and activation of suicidal ideation and behavior.   Discussed the need for patient to immediately call the office for any new or worsening symptoms, such as worsening  depression; feeling nervous or restless; suicidal thoughts or actions; or other changes in mood or behavior, and all other concerns. Patient educated on medication compliance and the risks of suddenly stopping this medication or missing doses. Patient verbalized understanding and is agreeable to taking Duloxetine. Addressed all questions and concerns.    Change Hydroxyzine (Vistaril) 25 mg by mouth daily TO Hydroxyzine (Atarax) 25 mg by mouth 3 times daily as needed to target anxiety, panic attacks due to increased risks of sedation with Vistaril.  Instructed to not take any other Antihistamine/Allergy medication such as but not limited to:  Zyrtec, Claritin, Allegra, or Benadryl within 4 hrs of taking Hydroxyzine.  Risks, benefits, alternatives discussed with patient including sedation, dizziness, fall risk, GI upset, and risk of increased CNS depression and elevated heart rate if taken with other antihistamines.  After discussion of these risks and benefits, the patient voiced understanding and agreed to proceed. Instructed patient to start keeping a small supply of Hydroxyzine in truck instead of Klonopin, which patient reported rare use.  Advised to no longer take Klonopin if Hydroxyzine effective along with coping strategies discussed today.     Stop Zoloft due to risks of serotonin syndrome in combination with Cymbalta, informed patient he may experience increased irritability and GI discomfort which should subside in a few days. If symptoms fail to improve contact provider.     Removed Prozac from medication profile as patient has not taken for some time.     Patient instructed to bring AVS with current medication list to pharmacy upon picking up Cymbalta and Hydroxyzine for accuracy. Highlighted area to show pharmacy.   Coping mechanisms discussed with patient today as a way to gain control over feelings to prevent situation or panic attack from getting worse: grounding techniques using 5 senses (name 3  things you can see, smell, touch, etc.), slow paced breathing techniques- focus on door inhaling and exhaling at each corner, application of cold compress/ice pack/water on face or opening freezer door to allow cold air to be breathed in taking slow deep breaths, closing eyes and focus on positive thoughts.    Patient presentation seems most consistent with depression, anxiety, and panic attacks.  Reviewed medication dispense record from [facility] pharmacy 1/1/21-12/31/22, reconciled medications.   Patient to contact provider if symptoms worsen or fail to improve.      Patient screened positive for depression based on a PHQ-9 score of 7 on 4/18/2023. Follow-up recommendations include: Prescribed antidepressant medication treatment and Suicide Risk Assessment performed.         TREATMENT PLAN/GOALS: Continue supportive psychotherapy efforts and medications as indicated. Treatment and medication options discussed during today's visit. Patient acknowledged and verbally consented to continue with current treatment plan and was educated on the importance of compliance with treatment and follow-up appointments.    MEDICATION ISSUES:  HANSEL reviewed as expected.  Discussed medication options and treatment plan of prescribed medication as well as the risks, benefits, and side effects including potential falls, possible impaired driving and metabolic adversities among others. Patient is agreeable to call the office with any worsening of symptoms or onset of side effects. Patient is agreeable to call 911 or go to the nearest ER should he/she begin having SI/HI. No medication side effects or related complaints today.     MEDS ORDERED DURING VISIT:  No orders of the defined types were placed in this encounter.      Return in about 6 weeks (around 7/11/2023) for medication check.         I spent 30 minutes caring for Naldo on this date of service. This time includes time spent by me in the following activities: preparing for the  visit, performing a medically appropriate examination and/or evaluation, counseling and educating the patient/family/caregiver, referring and communicating with other health care professionals, documenting information in the medical record and care coordination.      This document has been electronically signed by DELMY Rodriguez  May 30, 2023 10:42 EDT      Part of this note may be an electronic transcription/translation of spoken language to printed text using the Dragon Dictation System.

## 2023-05-30 NOTE — PATIENT INSTRUCTIONS
"1. Should you want to get in touch with your provider, DELMY Rodriguez, please contact MY Medical Assistant, Carlita, directly at [phone].  Recommend saving Carlita's direct number in phone as this is the PREFERRED & EASIEST way to get in contact with your provider.  Please leave a voice mail if you do not get an answer and she will return your call within 24 hrs. You will NOT be able to contact provider on qianchengwuyouhart, as Behavioral Health Providers are restricted. YOU MUST CALL [phone]    If you need to speak with the on call provider after hours or on weekends, please Contact the [facility] ([phone]) and staff will be able to page the provider on call directly.      2.  In the event you need to cancel an appointment, please notify the office at least 24 hrs prior:   Contact **Carlita Medical Assistant at [facility] directly at [phone] or the [facility] ([phone])       3, MEDICATION REFILLS:  PLEASE CALL THE PHARMACY TO REQUEST ALL MEDICATION REFILLS or via [employer] TO ENSURE YOU ARE RECEIVING YOUR MEDICATIONS IN A TIMELY MANNER. The pharmacy or [employer] jase will send this request ELECTRONICALLY to the ordering provider.     IF YOU USE AN AUTOMATED SERVICE AT THE PHARMACY FOR REFILLS AND ARE TOLD THERE ARE \"NO REFILLS REMAINING\"   PLEASE CALL THE PHARMACY & SPEAK TO A LIVE PERSON TO VERIFY IT IS THE MOST UP TO DATE PRESCRIPTION ON FILE.    All new prescriptions will have a different number, therefore, if you were given refills for a medication today or at last visit it will not have the same number as the previous prescription.       4.  In the event you have personal crisis, contact the following crisis numbers: Suicide Prevention Hotline 1-949.723.2056 or *988, [facility] Helpline 9-503-669-EOIU; [facility] Emergency Room [phone]; text HELLO to 766942; or 911.  If you feel like harming yourself or others, call 911 right away.  You can call the 646 Suicide and " "Crisis Lifeline at  988   to speak with a counselor at the lifeline, or you can connect with one using their online chat  .    5.  Never stop an antidepressant medicine without first talking to a healthcare provider. Suddenly stopping this type of medication can cause withdrawal symptoms.    6.  Counseling and talk therapy  Counseling or therapy teaches you new coping skills and more adaptive ways of thinking about problems. These tools can help you make positive changes. The benefits of counseling often last long after treatment sessions have stopped.    7.   We would appreciate your feedback, please scan the QRS code on the back of your appointment card (or see below) and complete a brief survey.  [place] location is still not available, so please click \"[place]\" location.  Thank you      SPECIFIC RECOMMENDATIONS:     1.      Medications discussed at this encounter:                   - no changes      2.      Psychotherapy recommendations: Declined     3.     Return to clinic: 6 weeks    Start tracking symptoms with date, time, symptoms experiencing, potential cause-what were you doing at the time of the sudden symptoms you experienced if you can recall.  How were you able to stop symptoms?   If symptoms are occurring more often than not, please call me so we can discuss.  Have your wife check your BP at least once weekly and let me know what those are when we meet next time.     Please arrive at least 15 minutes before your scheduled appointment time to complete check in process.      IF you are scheduled for a [employer] VIDEO visit, YOU MUST COMPLETE THE \"E-CHECK IN\" PROCESS PRIOR TO BEGINNING THE VISIT, YOU WILL NO LONGER RECEIVE A PHONE CALL PRIOR TO ALL VIDEO VISITS; You may still complete the E-Check in for in office visits prior to appointment, you will receive multiple text/email reminders which will direct you further if needed.           "

## 2023-06-07 ENCOUNTER — TELEPHONE (OUTPATIENT)
Dept: BEHAVIORAL HEALTH | Facility: CLINIC | Age: 43
End: 2023-06-07
Payer: COMMERCIAL

## 2023-06-07 NOTE — TELEPHONE ENCOUNTER
"Patient LMVM that his depression has \"really increased and I am beginning to have some SI.  Please advise    "

## 2023-06-07 NOTE — TELEPHONE ENCOUNTER
Tried returning call to patient but phone continually rang no answer and no voicemail option FYCLAUDIA

## 2023-06-08 ENCOUNTER — TELEPHONE (OUTPATIENT)
Dept: BEHAVIORAL HEALTH | Facility: CLINIC | Age: 43
End: 2023-06-08
Payer: COMMERCIAL

## 2023-06-08 DIAGNOSIS — F41.0 GENERALIZED ANXIETY DISORDER WITH PANIC ATTACKS: ICD-10-CM

## 2023-06-08 DIAGNOSIS — F41.1 GENERALIZED ANXIETY DISORDER WITH PANIC ATTACKS: ICD-10-CM

## 2023-06-08 DIAGNOSIS — F33.1 MAJOR DEPRESSIVE DISORDER, RECURRENT EPISODE, MODERATE: ICD-10-CM

## 2023-06-08 NOTE — TELEPHONE ENCOUNTER
"Patient called back today.  Patient said the entire day yesterday he was crying and sleeping.  Patient reports today he is not crying but still very depressed \"feels like I have a pit in my stomach and I feel very anxious.\" I explained to patient that if he is having SI to immediately to go to the ER so he may be evaluated\" Patient voiced understanding.  I asked patient if he would like me to send the message to a Provider who is in the [place] office patient stated No just send a message to Luz Elena and have her call me later.\" Patient denies plans to act on the SI.   PLEASE CALL.  "

## 2023-06-09 RX ORDER — DULOXETIN HYDROCHLORIDE 30 MG/1
CAPSULE, DELAYED RELEASE ORAL
Qty: 360 CAPSULE | Refills: 0
Start: 2023-06-09 | End: 2023-09-07

## 2023-06-09 NOTE — TELEPHONE ENCOUNTER
"Returned call to patient as requested patient reports this past Wed. Had a horrible day, and the last 2 days has been feeling better, no longer crying, still has knot in stomach, was worried due to having SI Tues night until Wed. \"It just crossed my mind, I wasn't acting on it or anything.\" Explains waking up Wed. Not feeling right, had to drive to [place] for a job and cried the entire way and ended up not being able to work and went to mom's and dad's and slept the rest of the day.  In regards to blood pressure, patient reports wife has checked and readings have been 124/84. And checks every 2 days. Patient is agreeable to add Cymbalta 30 mg at night and have wife continue to check blood pressure, patient reports having extra Cymbalta 30 mg on hand.  Instructed patient to continue 90 mg of Cymbalta in the morning and add 30 mg at night or he may take in the evening, patient verbalized understanding, updated order to reflect accuracy in EHR as a NO PRINT.  Patient to contact provider if symptoms worsen or fail to improve.  And if blood pressure readings elevate.   "

## 2023-07-31 ENCOUNTER — OFFICE VISIT (OUTPATIENT)
Dept: BEHAVIORAL HEALTH | Facility: CLINIC | Age: 43
End: 2023-07-31
Payer: COMMERCIAL

## 2023-07-31 VITALS
HEIGHT: 75 IN | HEART RATE: 94 BPM | SYSTOLIC BLOOD PRESSURE: 152 MMHG | DIASTOLIC BLOOD PRESSURE: 84 MMHG | BODY MASS INDEX: 37.82 KG/M2 | WEIGHT: 304.2 LBS

## 2023-07-31 DIAGNOSIS — F41.1 GENERALIZED ANXIETY DISORDER WITH PANIC ATTACKS: Primary | ICD-10-CM

## 2023-07-31 DIAGNOSIS — F41.0 GENERALIZED ANXIETY DISORDER WITH PANIC ATTACKS: Primary | ICD-10-CM

## 2023-07-31 DIAGNOSIS — F33.0 MDD (MAJOR DEPRESSIVE DISORDER), RECURRENT EPISODE, MILD: ICD-10-CM

## 2023-07-31 RX ORDER — TAMSULOSIN HYDROCHLORIDE 0.4 MG/1
0.4 CAPSULE ORAL DAILY
COMMUNITY
Start: 2023-02-16

## 2023-10-30 ENCOUNTER — OFFICE VISIT (OUTPATIENT)
Dept: BEHAVIORAL HEALTH | Facility: CLINIC | Age: 43
End: 2023-10-30
Payer: COMMERCIAL

## 2023-10-30 VITALS
SYSTOLIC BLOOD PRESSURE: 130 MMHG | DIASTOLIC BLOOD PRESSURE: 84 MMHG | HEART RATE: 106 BPM | BODY MASS INDEX: 37.13 KG/M2 | WEIGHT: 298.6 LBS | HEIGHT: 75 IN

## 2023-10-30 DIAGNOSIS — F41.1 GENERALIZED ANXIETY DISORDER WITH PANIC ATTACKS: ICD-10-CM

## 2023-10-30 DIAGNOSIS — F33.0 MDD (MAJOR DEPRESSIVE DISORDER), RECURRENT EPISODE, MILD: ICD-10-CM

## 2023-10-30 DIAGNOSIS — F41.0 GENERALIZED ANXIETY DISORDER WITH PANIC ATTACKS: ICD-10-CM

## 2023-10-30 PROCEDURE — 99214 OFFICE O/P EST MOD 30 MIN: CPT | Performed by: NURSE PRACTITIONER

## 2023-10-30 RX ORDER — DULOXETIN HYDROCHLORIDE 30 MG/1
CAPSULE, DELAYED RELEASE ORAL
Qty: 360 CAPSULE | Refills: 2 | Status: SHIPPED | OUTPATIENT
Start: 2023-10-30 | End: 2024-07-26

## 2023-10-30 NOTE — PATIENT INSTRUCTIONS
"1. Should you want to get in touch with your provider, DELMY Rodriguez, please contact MY Medical Assistant, Carlita, directly at [phone].  Recommend saving Carlita's direct number in phone as this is the PREFERRED & EASIEST way to get in contact with your provider.  Please leave a voice mail if you do not get an answer and she will return your call within 24 hrs. You will NOT be able to contact provider on [place], as Behavioral Health Providers are restricted. YOU MUST CALL [phone]  If you need to speak with the on call provider after hours or on weekends, please Contact the [facility] ([phone]) and staff will be able to page the provider on call directly.     2.  In the event you need to cancel an appointment, please notify the office at least 24 hrs prior:   Contact **Carlita Medical Assistant at [facility] directly at [phone] or the [facility] ([phone])     3. MEDICATION REFILLS:  PLEASE CALL THE PHARMACY TO REQUEST ALL MEDICATION REFILLS or via [employer] TO ENSURE YOU ARE RECEIVING YOUR MEDICATIONS IN A TIMELY MANNER. The pharmacy or [employer] jase will send this request ELECTRONICALLY to the ordering provider.   IF YOU USE AN AUTOMATED SERVICE AT THE PHARMACY FOR REFILLS AND ARE TOLD THERE ARE \"NO REFILLS REMAINING\"   PLEASE CALL THE PHARMACY & SPEAK TO A LIVE PERSON TO VERIFY IT IS THE MOST UP TO DATE PRESCRIPTION ON FILE.    All new prescriptions will have a different number, therefore, if you were given refills for a medication today or at last visit it will not have the same number as the previous prescription.     4.  In the event you have personal crisis, contact the following crisis numbers: Suicide Prevention Hotline 1-670.184.3243 or *988, [facility] Helpline 7-488-306-BHJL; [facility] Emergency Room [phone]; text HELLO to 148670; or 911.  If you feel like harming yourself or others, call 911 right away.  You can call the 984 Suicide and Crisis " "Lifeline at  988   to speak with a counselor at the [facility], or you can connect with one using their online chat  .    5.  Never stop an antidepressant medicine without first talking to a healthcare provider. Suddenly stopping this type of medication can cause withdrawal symptoms.    6.  Counseling and talk therapy  Counseling or therapy teaches you new coping skills and more adaptive ways of thinking about problems. These tools can help you make positive changes. The benefits of counseling often last long after treatment sessions have stopped.    7.   We would appreciate your feedback, please scan the QRS code on the back of your appointment card (or see below) and complete a brief survey.  [place] location is still not available, so please click \"[place]\" location.  Thank you      SPECIFIC RECOMMENDATIONS:     1.      Medications discussed at this encounter:                   -  Refilled Cymbalta for 90 days with 2 refills.    2.      Psychotherapy recommendations: Declined     3.     Return to clinic: 4 months, Monday 3/4/24 at 4 pm     Please arrive at least 15 minutes before your scheduled appointment time to complete check in process.      IF you are scheduled for a [facility] [employer] VIDEO visit, YOU MUST COMPLETE THE \"E-CHECK IN\" PROCESS PRIOR TO BEGINNING THE VISIT, YOU WILL NO LONGER RECEIVE A PHONE CALL PRIOR TO ALL VIDEO VISITS; You may still complete the E-Check in for in office visits prior to appointment, you will receive multiple text/email reminders which will direct you further if needed.           "

## 2023-10-30 NOTE — PROGRESS NOTES
"  Subjective   Naldo Womack is a 43 y.o. male who presents today for follow up    Referring Provider:  No referring provider defined for this encounter.    Chief Complaint:  Depression and anxiety    Answers submitted by the patient for this visit:  Primary Reason for Visit (Submitted on 10/23/2023)  What is the primary reason for your visit?: Other  Other (Submitted on 10/23/2023)  Please describe your symptoms.: Depression  Have you had these symptoms before?: Yes  How long have you been having these symptoms?: Greater than 2 weeks    History of Present Illness:    10/30/23:  Patient presents today in office \"everything is working very well. The last month has had more stressors.  As father got COVID, was in the hospital, suffered pneumonia, and heart attack, and was able to go home and passed away at home after all children and grandchildren visited, and passed away after being home 3 days.  Family thought he was doing well and didn't have hosparus after discharge.  Patient father had a stroke before in July and had minimal movement to right side, though when had COVID father declined further.  Overall patient feels he is coping well.      Reports continues to take Hydroxyzine 25 mg once daily though during  did take one before proceedings which helped. Patient feels Cymbalta has been effective, blood pressure today lower than past, overall feels anxiety and depression are well controlled.  Denies further panic attacks.    Depression: Patient complains of depression. He complains of depressed mood, difficulty concentrating, fatigue, feelings of worthlessness/guilt, insomnia, and weight gain  Anxiety:  The patient endorses significant symptoms of anxiety including: restlessness or feeling keyed up, being easily fatigued, difficulty concentrating or mind going blank, irritability, and sleep disturbance which have caused impairment in important areas of daily functioning.     23:   Answers submitted " "by the patient for this visit:  Primary Reason for Visit (Submitted on 7/10/2023)  What is the primary reason for your visit?: Other  Other (Submitted on 7/10/2023)  Please describe your symptoms.: Depression  Have you had these symptoms before?: Yes  How long have you been having these symptoms?: Greater than 2 weeks    Patient presents today in office with a blood pressure 164/104 today, reports wife has been checking at home at least 2 times per week 130's/80's. 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  Denies headaches, chest pain, soa, dizziness, nor sweating.     Instructed patient to continue 90 mg of Cymbalta in the morning and add 30 mg at night or he may take in the evening on 6/8/23 at last appointment, which patient reports tolerating well, noted effectiveness.    Patient went on vacation last week with family and received positive feedback, \"I was more involved in conversation. I think there's been a big improvement, I haven't had any sadness.\" Patient also attended a graduation and was more engaged.     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 Mother is now back home. Family has alternated staying with mother.  Health concerns with father has been stressful.     Patient continues to take daily morning Hydroxyzine, has not required other use, keeps supply in truck, though has not needed. Denies panic attacks.     Recheck blood pressure manually to right arm 152/84, hr 94    5/30/23:  Patient presents today in office, \"The first 3 weeks coming off that medicine I had angry spells, up and down, but the last 2-3 days I have been crying for no reason.\" Patient was taken off Zoloft 50 mg at last visit due to also taking Cymbalta " "90 mg.     At last visit Hydroxyzine (Vistaril) was changed from 25 mg by mouth daily TO Hydroxyzine (Atarax) 25 mg by mouth 3 times daily as needed to target anxiety, panic attacks due to increased risks of sedation with Vistaril.  Patient was also instructed patient to start keeping a small supply of Hydroxyzine in truck instead of Klonopin, which patient reported rare use.  Advised to no longer take Klonopin if Hydroxyzine effective along with coping strategies discussed today.   Patient has not required use as as needed, and takes daily in the morning,and keeps extra in vehicle which patient has not had to take.  Denies drowsiness.     Panic attacks: denies  Patient weight noted with a decrease of 7 lbs, however, patient reports weight fluctuates often, and believes prior weight may have been up due to work clothes and pockets with items.     Patient no longer having anger spells, currently feelings of emptiness. Denies external stressors or changes.  This past Saturday went out with family, and Sunday night went to a concert, which patient found guillermo.  \"It comes out of nowhere, it hits me all at once.\"  Patient will try to distract self rather than dwell, though sometimes unable to distract.  Reports more sadness, hopeless feeling, and starts to cry, rather than anxiousness.        4/18/23:  INITIAL EVAL  Patient presents today in office with a history of anxiety and depression for which treatment began approximately 20 yrs ago.  Patient is currently prescribed Cymbalta 90 mg, Zoloft 50 mg, and Hydroxyzine (Vistaril) 25 mg daily, which have provided effectiveness.  Patient has a PMHX of Hypertension, Migraines, ANGELICA with use of CPAP, hypogonadism, ED, prostate disorder, Arthritis, gout, lumbar back surgery, peripheral neuropathy (BLE) and allergic rhinitis.    Patient goal of treatment \"just to feel somewhat normal, and have some emotion, right now I feel I am just here.\"    Patient was with an [facility] provider for " "approximately 1.5 yrs, last seen via video approximately 1 month ago, was not happy with care due to frequent medication changes.  As patient reports taking 4 different medications at the same time, and is unable to determine their effectiveness.     Patient reports having thoughts of a truck hitting him while driving, feels worthless at times.  Denies suicidal planning, thoughts of action plan, or rumination.  \"That family would be better off dead, negative thoughts start to spiral through, and over thinks meaning how much better would my family be if this truck lost control.\"  Once patient is around his 3 kids or is busy at work he is able to alleviate negative thoughts.  These thoughts have occurred once every several months.      Wife tells patient he is emotionless, which has been going on for approximately 6-8 months.  Patient recalls while family was bowling, family was having fun, showing emotion, though patient had no expression or emotion to surroundings.      Patient is certain he is taking Cymbalta 90 mg in the morning, Hydroxyzine 25 mg daily in the morning is allowed to take up to 3 times daily,  Zoloft 50 mg in the morning.  The Klonopin 0.5 mg was filled 22 for 15 tabs, and last took yesterday due to elevated stress and prior was at least 2-3 months.  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      In regards to anxiety, \"it is out of nowhere\", will feel discomfort to chest, anxiousness, feeling soa, which is typically felt after arrival to work which is approximately 10 minutes after arising. Though has felt symptoms in the past without being work related.  Patient unable to identify specific triggers of anxiety.  Patient will try to calm self by listening to music alone in truck, distractions which seems to help if anxiety is not " as bad.   And if not relieved will only take half of the Klonopin 0.5 mg which is effective, as patient try's to not take unless absolutely necessary. Patient believes he has 8-9 tabs of Klonopin remaining.  Patient denies use of as needed Hydroxyzine in place of Klonopin in the past.     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 Patient does suffer from arthritis. At times patient does experience blurred vision, which is intermittent. Denies use of prescription lenses or contacts or recent eye exam. Left hand does shake with fine motor skills such as writing, which patient recently noticed over the last few months.     Cymbalta for approximately 3 yrs, had been effective, however, per pharmacy record patient provided today, dose was increased from 60 mg to 90 mg 8/30/22.  And Zoloft 50 mg started 9/19/22.  Nerve pain to ble has been managed well with Cymbalta.  Patient occasionally takes another antihistamine a few weeks ago due to allergy season, otherwise does not take routinely or past start of spring.  Denies difficulty with sleep since starting using CPAP.  Denies day time drowsiness.  Confirmed patient is no longer taking Prozac, Rexulti, Buspar, or Effexor XR.      Depression: Patient complains of depression. He complains of anhedonia, depressed mood, difficulty concentrating, feelings of worthlessness/guilt and appetite changes.         Anxiety: The patient endorses significant symptoms of anxiety including:  anxioiusness, feeling on edge, , restlessness or feeling keyed up, being easily fatigued, difficulty concentrating or mind going blank, irritability, muscle tension and sleep disturbance which have caused impairment in important areas of daily functioning.  The patient has had symptoms of anxiety for over 20 yrs, which have worsened over the last 8-9 months.       PHQ-9 Depression Screening  PHQ-9 Total Score: (P) 6  10/29/2023 6     Little interest or pleasure in doing things? (P) 0-->not at all   Feeling down, depressed, or hopeless? (P) 1-->several days   Trouble falling or staying asleep, or sleeping too much? (P) 1-->several days   Feeling tired or having little energy? (P) 1-->several days   Poor appetite or overeating? (P) 1-->several days   Feeling bad about yourself - or that you are a failure or have let yourself or your family down? (P) 1-->several days   Trouble concentrating on things, such as reading the newspaper or watching television? (P) 1-->several days   Moving or speaking so slowly that other people could have noticed? Or the opposite - being so fidgety or restless that you have been moving around a lot more than usual? (P) 0-->not at all   Thoughts that you would be better off dead, or of hurting yourself in some way? (P) 0-->not at all   PHQ-9 Total Score (P) 6     BERNICE-7  Feeling nervous, anxious or on edge: (P) Several days  Not being able to stop or control worrying: (P) Several days  Worrying too much about different things: (P) Several days  Trouble Relaxing: (P) Not at all  Being so restless that it is hard to sit still: (P) Several days  Feeling afraid as if something awful might happen: (P) Not at all  Becoming easily annoyed or irritable: (P) Several days  BERNICE 7 Total Score: (P) 5  If you checked any problems, how difficult have these problems made it for you to do your work, take care of things at home, or get along with other people: (P) Not difficult at all 10/29/2023      Past Surgical History:  Past Surgical History:   Procedure Laterality Date    BACK SURGERY      x 2       Problem List:  Patient Active Problem List   Diagnosis    Anxiety    Depressive disorder    History of colonic polyps    Hyperglycemia    Hyperlipidemia    Low back pain    Pain in soft tissues of limb    Thoracic spondylosis with myelopathy       Allergy:   No Known Allergies     Discontinued Medications:  Medications  "Discontinued During This Encounter   Medication Reason    DULoxetine (CYMBALTA) 30 MG capsule Reorder       Current Medications:   Current Outpatient Medications   Medication Sig Dispense Refill    atorvastatin (LIPITOR) 20 MG tablet Take 1 tablet by mouth every night at bedtime.      clonazePAM (KlonoPIN) 0.5 MG tablet Take 1 tablet by mouth As Needed for Anxiety (for Anxiety attacks).      DULoxetine (CYMBALTA) 30 MG capsule Take 3 capsules by mouth Every Morning AND 1 capsule Every Night. Do all this for 270 days. Indications: Disease of the Peripheral Nerves, Generalized Anxiety Disorder, Major Depressive Disorder 360 capsule 2    hydrOXYzine (ATARAX) 25 MG tablet TAKE ONE TABLET BY MOUTH THREE TIMES A DAY AS NEEDED FOR ANXIETY 90 tablet 1    tamsulosin (FLOMAX) 0.4 MG capsule 24 hr capsule Take 1 capsule by mouth Daily.       No current facility-administered medications for this visit.       Past Medical History:  Past Medical History:   Diagnosis Date    Anxiety     Chronic pain disorder     Depression     Obsessive-compulsive disorder     Panic disorder     Peripheral neuropathy        Past Psychiatric History:  Began Treatment: 20 yrs ago prior to getting , though dealt with symptoms since childhood  Diagnoses:Depression, Anxiety and OCD, panic disorder  Psychiatrist: DELMY Alamo with [facility] 1.5 yrs, last seen 3/2022  Therapist: 2- one had about 6 visits approx 15 yrs ago; and 8 yrs ago seen a lady in [place]  Admission History:Denies  Medication Trials: several changes while at [facility], \"certain one's i took in the morning and i couldn't get out of truck, like i was froze, worrying\"added several meds to Cymbalta; Effexor XR, Buspar was unable to keep up with overlapping med orders; Rexulti, Prozac, and Zoloft (9/2022-4/18/23 stopped due to risk of serotonin syndrome wihile taking with Cymbalta)  Self Harm: Denies  Suicide Attempts:Denies      Substance Abuse History:   Types:Denies all, including " illicit  Withdrawal Symptoms:Denies  Longest Period Sober:Not Applicable   AA: Not applicable     Social History:  Martial Status:  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, 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   Kids:Yes or If so, how many 3- age 13,10, and 7 as of 4/2023  House:Lives in a house   History: Denies  Access to Guns:  Yes, locked in safe    Social History     Socioeconomic History    Marital status:    Tobacco Use    Smoking status: Never    Smokeless tobacco: Current     Types: Snuff   Vaping Use    Vaping Use: Never used   Substance and Sexual Activity    Drug use: Never     Comment: only anything ever prescribed (but did one time fail drug test due to CBD drops not using currently)    Sexual activity: Yes       Family History:   Suicide Attempts: Denies  Suicide Completions:Denies      Family History   Problem Relation Age of Onset    Depression Mother     Anxiety disorder Mother     Alcohol abuse Father     Alcohol abuse Sister     Alcohol abuse Brother        Developmental History:   Born: [place]  Siblings:3 sisters, 1 brother  Childhood: Denies Abuse  High School:Completed  College: [employer] degree    Mental Status Exam:   Hygiene:   good  Cooperation:  Cooperative  Eye Contact:  Good  Psychomotor Behavior:  Appropriate  Affect:  Appropriate  Mood: euthymic    Speech:  Normal  Thought Process:  Goal directed  Thought Content:  Mood congruent  Suicidal:  None  Homicidal:  None  Hallucinations:  None  Delusion:  None  Memory:  Intact  Orientation:  Grossly intact  Reliability:  good  Insight:  Good  Judgement:  Good  Impulse Control:  Good  Physical/Medical Issues:  Yes Hypertension, Migraines, ANGELICA with CPAP, peripheral neuropathy, ED, Hypogonadism, prostate disorder, Arthritis, gout, lumbar back surgery, and allergic rhinitis.      Review of Systems:  Review of Systems   Constitutional:  Negative for fatigue.   HENT:  Negative for trouble swallowing.    Respiratory:  Positive for  "apnea. Negative for cough and shortness of breath.         CPAP   Cardiovascular:  Negative for chest pain and palpitations.   Gastrointestinal:  Negative for diarrhea and nausea.   Musculoskeletal:  Positive for arthralgias and back pain.   Neurological:  Positive for numbness. Negative for seizures.        Ble peripheral neuropathy   Psychiatric/Behavioral:  Negative for agitation, confusion, decreased concentration, hallucinations, self-injury, sleep disturbance and suicidal ideas. The patient is nervous/anxious. The patient is not hyperactive.          Physical Exam:  Physical Exam  Psychiatric:         Attention and Perception: Attention and perception normal.         Mood and Affect: Mood and affect normal.         Speech: Speech normal.         Behavior: Behavior normal. Behavior is cooperative.         Thought Content: Thought content normal. Thought content does not include suicidal ideation. Thought content does not include suicidal plan.         Cognition and Memory: Cognition and memory normal.         Judgment: Judgment normal.         Vital Signs:   /84   Pulse 106   Ht 190.5 cm (75\")   Wt 135 kg (298 lb 9.6 oz)   BMI 37.32 kg/m²      Lab Results:   No visits with results within 6 Month(s) from this visit.   Latest known visit with results is:   No results found for any previous visit.       EKG Results:  No orders to display       Imaging Results:  No Images in the past 120 days found..      Assessment & Plan   Diagnoses and all orders for this visit:    1. MDD (major depressive disorder), recurrent episode, mild  -     DULoxetine (CYMBALTA) 30 MG capsule; Take 3 capsules by mouth Every Morning AND 1 capsule Every Night. Do all this for 270 days. Indications: Disease of the Peripheral Nerves, Generalized Anxiety Disorder, Major Depressive Disorder  Dispense: 360 capsule; Refill: 2    2. Generalized anxiety disorder with panic attacks  -     DULoxetine (CYMBALTA) 30 MG capsule; Take 3 " capsules by mouth Every Morning AND 1 capsule Every Night. Do all this for 270 days. Indications: Disease of the Peripheral Nerves, Generalized Anxiety Disorder, Major Depressive Disorder  Dispense: 360 capsule; Refill: 2            Visit Diagnoses:    ICD-10-CM ICD-9-CM   1. MDD (major depressive disorder), recurrent episode, mild  F33.0 296.31   2. Generalized anxiety disorder with panic attacks  F41.1 300.02    F41.0 300.01           PLAN:  Safety: No acute safety concerns  Therapy: Declines due to inconsistent work schedule  Risk Assessment: Risk of self-harm acutely is moderate.  Risk factors include anxiety disorder, mood disorder, access to guns/weapons.  Protective factors include no family history, no present SI, no history of suicide attempts or self-harm in the past, minimal AODA, healthcare seeking, future orientation, willingness to engage in care.  Risk of self-harm chronically is also moderate, but could be further elevated in the event of treatment noncompliance and/or AODA.  Meds:  Continue Cymbalta (Duloxetine) 90 mg by mouth daily in the morning AND 30 mg nightly to target depression and anxiety as well as peripheral neuropathy.   Discussed all risks, benefits, alternatives, and side effects of Duloxetine (Cymbalta) including but not limited to GI upset, sexual dysfunction, bleeding risk, seizure risk, weight loss, insomnia, diaphoresis, drowsiness, headache, dizziness, fatigue, activation of kimmie or hypomania, increased fragility fracture risk, hyponatremia, increased BP, hepatotoxicity, ocular effects, withdrawal syndrome following abrupt discontinuation, serotonin syndrome, and activation of suicidal ideation and behavior.   Discussed the need for patient to immediately call the office for any new or worsening symptoms, such as worsening depression; feeling nervous or restless; suicidal thoughts or actions; or other changes in mood or behavior, and all other concerns. Patient educated on  medication compliance and the risks of suddenly stopping this medication or missing doses. Patient verbalized understanding and is agreeable to taking Duloxetine. Addressed all questions and concerns.  Refilled today 90 days with 2 refills.     Continue Hydroxyzine (Atarax) 25 mg by mouth 3 times daily as needed to target anxiety, panic attacks. Instructed to not take any other Antihistamine/Allergy medication such as but not limited to:  Zyrtec, Claritin, Allegra, or Benadryl within 4 hrs of taking Hydroxyzine.  Risks, benefits, alternatives discussed with patient including sedation, dizziness, fall risk, GI upset, and risk of increased CNS depression and elevated heart rate if taken with other antihistamines.  After discussion of these risks and benefits, the patient voiced understanding and agreed to proceed.      Labs: n/a    Symptoms of anxiety, depression are under good control with current medication regimen.    Patient was given instructions and counseling regarding condition and for health maintenance advice. Please see specific information pulled into the AVS if appropriate.    Patient to contact provider if symptoms worsen or fail to improve.        7/31/23:   Therapy: Declines due to inconsistent work schedule  -Continue Cymbalta (Duloxetine) 90 mg by mouth daily in the morning AND 30 mg nightly to target depression and anxiety as well as peripheral neuropathy.  NR needed today  -Continue Hydroxyzine (Atarax) 25 mg by mouth 3 times daily as needed to target anxiety, panic attacks. Instructed to not take any other Antihistamine/Allergy medication such as but not limited to:  Zyrtec, Claritin, Allegra, or Benadryl within 4 hrs of taking Hydroxyzine.  Denies drowsiness with once daily dose every morning, has not required more than once daily.  -Patient instructed to request refill when appropriate from pharmacy or via [facility]. Cymbalta prescription will end 9/7/23, may request up to 7 days prior.  "    Symptoms of anxiety with panic attacks and depression are under good control with current medication regimen.  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   Patient was given instructions and counseling regarding condition and for health maintenance advice. Please see specific information pulled into the AVS if appropriate.    Patient to contact provider if symptoms worsen or fail to improve.      6/8/23: TELEPHONE  Patient called back today.  Patient said the entire day yesterday he was crying and sleeping.  Patient reports today he is not crying but still very depressed \"feels like I have a pit in my stomach and I feel very anxious.\" I explained to patient that if he is having SI to immediately to go to the ER so he may be evaluated\" Patient voiced understanding.  I asked patient if he would like me to send the message to a Provider who is in the [place] office patient stated No just send a message to Luz Elena and have her call me later.\" Patient denies plans to act on the SI.   PLEASE CALL.    Returned call to patient as requested patient reports this past Wed. Had a horrible day, and the last 2 days has been feeling better, no longer crying, still has knot in stomach, was worried due to having SI Tues night until Wed. \"It just crossed my mind, I wasn't acting on it or anything.\" Explains waking up Wed. Not feeling right, had to drive to [place] for a job and cried the entire way and ended up not being able to work and went to mom's and dad's and slept the rest of the day.  In regards to blood pressure, patient reports wife has checked and readings have been 124/84. And checks every 2 days. Patient is agreeable to add Cymbalta 30 mg at night and have wife continue to " "check blood pressure, patient reports having extra Cymbalta 30 mg on hand.  Instructed patient to continue 90 mg of Cymbalta in the morning and add 30 mg at night or he may take in the evening, patient verbalized understanding, updated order to reflect accuracy in EHR as a NO PRINT.  Patient to contact provider if symptoms worsen or fail to improve.  And if blood pressure readings elevate.     6/7/23: TELEPHONE  Patient LMVM that his depression has \"really increased and I am beginning to have some SI.  Please advise  Tried returning call to patient but phone continually rang no answer and no voicemail option.    5/30/23:   -Continue Cymbalta (Duloxetine) 90 mg by mouth daily in the morning to target depression and anxiety as well as peripheral neuropathy.    -Continue Hydroxyzine (Atarax) 25 mg by mouth 3 times daily as needed to target anxiety, panic attacks. Denies drowsiness with once daily dose every morning, has not required more than once daily.  Patient instructed to Start tracking symptoms with date, time, symptoms experiencing, potential cause-what were you doing at the time of the sudden symptoms you experienced if you can recall.  How were you able to stop symptoms? And have wife check blood pressure at least once weekly.     Symptoms of anxiety, panic attacks, and depression are under fair control with current medication regimen.  Due to duration of current symptoms expressed today and elevated blood pressure, will have patient start tracking symptoms, have wife check blood pressure at least once weekly and report with next visit.  Patient advised to contact provider if symptoms are occurring more often than not, as we discussed adding Cymbalta 30 mg nightly, though would like to see blood pressure more stable and symptom tracking before proceeding, as patient was informed if blood pressure did continue to elevate would have to stop the 30 mg dose.   Patient to contact provider if symptoms worsen or fail " to improve.       4/18/23:   Safety: No acute safety concerns  Therapy: Declines due to inconsistent work schedule  Continue Cymbalta (Duloxetine) 90 mg by mouth daily in the morning to target depression and anxiety as well as peripheral neuropathy.   Discussed all risks, benefits, alternatives, and side effects of Duloxetine (Cymbalta) including but not limited to GI upset, sexual dysfunction, bleeding risk, seizure risk, weight loss, insomnia, diaphoresis, drowsiness, headache, dizziness, fatigue, activation of kimmie or hypomania, increased fragility fracture risk, hyponatremia, increased BP, hepatotoxicity, ocular effects, withdrawal syndrome following abrupt discontinuation, serotonin syndrome, and activation of suicidal ideation and behavior.   Discussed the need for patient to immediately call the office for any new or worsening symptoms, such as worsening depression; feeling nervous or restless; suicidal thoughts or actions; or other changes in mood or behavior, and all other concerns. Patient educated on medication compliance and the risks of suddenly stopping this medication or missing doses. Patient verbalized understanding and is agreeable to taking Duloxetine. Addressed all questions and concerns.    Change Hydroxyzine (Vistaril) 25 mg by mouth daily TO Hydroxyzine (Atarax) 25 mg by mouth 3 times daily as needed to target anxiety, panic attacks due to increased risks of sedation with Vistaril.  Instructed to not take any other Antihistamine/Allergy medication such as but not limited to:  Zyrtec, Claritin, Allegra, or Benadryl within 4 hrs of taking Hydroxyzine.  Risks, benefits, alternatives discussed with patient including sedation, dizziness, fall risk, GI upset, and risk of increased CNS depression and elevated heart rate if taken with other antihistamines.  After discussion of these risks and benefits, the patient voiced understanding and agreed to proceed. Instructed patient to start keeping a small  supply of Hydroxyzine in truck instead of Klonopin, which patient reported rare use.  Advised to no longer take Klonopin if Hydroxyzine effective along with coping strategies discussed today.     Stop Zoloft due to risks of serotonin syndrome in combination with Cymbalta, informed patient he may experience increased irritability and GI discomfort which should subside in a few days. If symptoms fail to improve contact provider.     Removed Prozac from medication profile as patient has not taken for some time.     Patient instructed to bring AVS with current medication list to pharmacy upon picking up Cymbalta and Hydroxyzine for accuracy. Highlighted area to show pharmacy.   Coping mechanisms discussed with patient today as a way to gain control over feelings to prevent situation or panic attack from getting worse: grounding techniques using 5 senses (name 3 things you can see, smell, touch, etc.), slow paced breathing techniques- focus on door inhaling and exhaling at each corner, application of cold compress/ice pack/water on face or opening freezer door to allow cold air to be breathed in taking slow deep breaths, closing eyes and focus on positive thoughts.    Patient presentation seems most consistent with depression, anxiety, and panic attacks.  Reviewed medication dispense record from [facility] pharmacy 1/1/21-12/31/22, reconciled medications.   Patient to contact provider if symptoms worsen or fail to improve.      Patient screened positive for depression based on a PHQ-9 score of 6 on 10/29/2023. Follow-up recommendations include: Prescribed antidepressant medication treatment and Suicide Risk Assessment performed.         TREATMENT PLAN/GOALS: Continue supportive psychotherapy efforts and medications as indicated. Treatment and medication options discussed during today's visit. Patient acknowledged and verbally consented to continue with current treatment plan and was educated on the importance of compliance  with treatment and follow-up appointments.    MEDICATION ISSUES:  HANSEL reviewed as expected.  Discussed medication options and treatment plan of prescribed medication as well as the risks, benefits, and side effects including potential falls, possible impaired driving and metabolic adversities among others. Patient is agreeable to call the office with any worsening of symptoms or onset of side effects. Patient is agreeable to call 911 or go to the nearest ER should he/she begin having SI/HI. No medication side effects or related complaints today.     MEDS ORDERED DURING VISIT:  New Medications Ordered This Visit   Medications    DULoxetine (CYMBALTA) 30 MG capsule     Sig: Take 3 capsules by mouth Every Morning AND 1 capsule Every Night. Do all this for 270 days. Indications: Disease of the Peripheral Nerves, Generalized Anxiety Disorder, Major Depressive Disorder     Dispense:  360 capsule     Refill:  2       Return in about 4 months (around 2/29/2024) for medication check.         I spent 22 minutes caring for Naldo on this date of service. This time includes time spent by me in the following activities: preparing for the visit, performing a medically appropriate examination and/or evaluation, counseling and educating the patient/family/caregiver, ordering medications, tests, or procedures, referring and communicating with other health care professionals, documenting information in the medical record, and care coordination.      This document has been electronically signed by DELMY Rodriguez  October 30, 2023 16:04 EDT      Part of this note may be an electronic transcription/translation of spoken language to printed text using the Dragon Dictation System.

## 2024-03-04 ENCOUNTER — OFFICE VISIT (OUTPATIENT)
Dept: BEHAVIORAL HEALTH | Facility: CLINIC | Age: 44
End: 2024-03-04
Payer: COMMERCIAL

## 2024-03-04 VITALS
HEIGHT: 75 IN | HEART RATE: 104 BPM | SYSTOLIC BLOOD PRESSURE: 140 MMHG | WEIGHT: 299 LBS | DIASTOLIC BLOOD PRESSURE: 86 MMHG | BODY MASS INDEX: 37.18 KG/M2

## 2024-03-04 DIAGNOSIS — F41.1 GENERALIZED ANXIETY DISORDER: Primary | ICD-10-CM

## 2024-03-04 DIAGNOSIS — F33.0 MDD (MAJOR DEPRESSIVE DISORDER), RECURRENT EPISODE, MILD: ICD-10-CM

## 2024-03-04 DIAGNOSIS — R47.89 WORD FINDING DIFFICULTY: ICD-10-CM

## 2024-03-04 PROCEDURE — 99214 OFFICE O/P EST MOD 30 MIN: CPT | Performed by: NURSE PRACTITIONER

## 2024-03-04 NOTE — PROGRESS NOTES
"  Subjective   Naldo Womack is a 43 y.o. male who presents today for follow up    Referring Provider:  No referring provider defined for this encounter.    Chief Complaint:  Depression, anxiety, difficulty with word finding    Answers submitted by the patient for this visit:  Primary Reason for Visit (Submitted on 3/3/2024)  What is the primary reason for your visit?: Other  Other (Submitted on 3/3/2024)  Please describe your symptoms.: Depression  Have you had these symptoms before?: Yes  How long have you been having these symptoms?: Greater than 2 weeks      History of Present Illness:    3/4/24: Patient presents today in office.  No longer taking Hydroxyzine as patient has not had any panic attacks. Patient reports in Jan. 2024 had 2 days of feeling anxious though easily subsided.  Patient noticed while in conversation with other co-workers will forget words or names, even if family members, cannot recall name but can visualize their face, it used to happen minimally though has noticed more often the last few months.  Used to occur once a month in the past, now happening 5-6 times per day.  \"My mind will go blank for that one word.\"   Patient reports sleeping well with BiPap, able to wake up before alarm for the last 4 months, and is ready to get up and proceed with day. Patient denies other related memory problems.    Patient feels work and family load has lightened up, has a third of the stress he used to have in the summer time.   Patient had flu A, flu B, and Covid this year which were treated in [facility], believes was seen 2/6/24 and in 1/2024.   Patient recalls having difficulty in Dec before transmission of COVID, has had Covid (March 2023)a few times though minimal symptoms, coughs and chills. Patient is due for annual PCP visit and plans to get labs and see provider in the next few weeks.   Years ago would have difficulty if had to speak in front of large crowd, though otherwise has not been occurring " "until more frequently the last few months.     Depression and anxiety are fairly well controlled, denies new stressors, anxiousness, life changes.  Denies other memory difficulties.       10/30/23:    Answers submitted by the patient for this visit:  Primary Reason for Visit (Submitted on 10/23/2023)  What is the primary reason for your visit?: Other  Other (Submitted on 10/23/2023)  Please describe your symptoms.: Depression  Have you had these symptoms before?: Yes  How long have you been having these symptoms?: Greater than 2 weeks    Patient presents today in office \"everything is working very well. The last month has had more stressors.  As father got COVID, was in the hospital, suffered pneumonia, and heart attack, and was able to go home and passed away at home after all children and grandchildren visited, and passed away after being home 3 days.  Family thought he was doing well and didn't have hosparus after discharge.  Patient father had a stroke before in July and had minimal movement to right side, though when had COVID father declined further.  Overall patient feels he is coping well.      Reports continues to take Hydroxyzine 25 mg once daily though during  did take one before proceedings which helped. Patient feels Cymbalta has been effective, blood pressure today lower than past, overall feels anxiety and depression are well controlled.  Denies further panic attacks.    Depression: Patient complains of depression. He complains of depressed mood, difficulty concentrating, fatigue, feelings of worthlessness/guilt, insomnia, and weight gain  Anxiety:  The patient endorses significant symptoms of anxiety including: restlessness or feeling keyed up, being easily fatigued, difficulty concentrating or mind going blank, irritability, and sleep disturbance which have caused impairment in important areas of daily functioning.     23:   Answers submitted by the patient for this visit:  Primary " "Reason for Visit (Submitted on 7/10/2023)  What is the primary reason for your visit?: Other  Other (Submitted on 7/10/2023)  Please describe your symptoms.: Depression  Have you had these symptoms before?: Yes  How long have you been having these symptoms?: Greater than 2 weeks    Patient presents today in office with a blood pressure 164/104 today, reports wife has been checking at home at least 2 times per week 130's/80's. 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  Denies headaches, chest pain, soa, dizziness, nor sweating.     Instructed patient to continue 90 mg of Cymbalta in the morning and add 30 mg at night or he may take in the evening on 6/8/23 at last appointment, which patient reports tolerating well, noted effectiveness.    Patient went on vacation last week with family and received positive feedback, \"I was more involved in conversation. I think there's been a big improvement, I haven't had any sadness.\" Patient also attended a graduation and was more engaged.     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 Mother is now back home. Family has alternated staying with mother.  Health concerns with father has been stressful.     Patient continues to take daily morning Hydroxyzine, has not required other use, keeps supply in truck, though has not needed. Denies panic attacks.     Recheck blood pressure manually to right arm 152/84, hr 94    5/30/23:  Patient presents today in office, \"The first 3 weeks coming off that medicine I had angry spells, up and down, but the last 2-3 days I have been crying for no reason.\" Patient was taken off Zoloft 50 mg at last visit due to also taking Cymbalta 90 mg.     At last visit Hydroxyzine " "(Vistaril) was changed from 25 mg by mouth daily TO Hydroxyzine (Atarax) 25 mg by mouth 3 times daily as needed to target anxiety, panic attacks due to increased risks of sedation with Vistaril.  Patient was also instructed patient to start keeping a small supply of Hydroxyzine in truck instead of Klonopin, which patient reported rare use.  Advised to no longer take Klonopin if Hydroxyzine effective along with coping strategies discussed today.   Patient has not required use as as needed, and takes daily in the morning,and keeps extra in vehicle which patient has not had to take.  Denies drowsiness.     Panic attacks: denies  Patient weight noted with a decrease of 7 lbs, however, patient reports weight fluctuates often, and believes prior weight may have been up due to work clothes and pockets with items.     Patient no longer having anger spells, currently feelings of emptiness. Denies external stressors or changes.  This past Saturday went out with family, and Sunday night went to a concert, which patient found guillermo.  \"It comes out of nowhere, it hits me all at once.\"  Patient will try to distract self rather than dwell, though sometimes unable to distract.  Reports more sadness, hopeless feeling, and starts to cry, rather than anxiousness.        4/18/23:  INITIAL EVAL  Patient presents today in office with a history of anxiety and depression for which treatment began approximately 20 yrs ago.  Patient is currently prescribed Cymbalta 90 mg, Zoloft 50 mg, and Hydroxyzine (Vistaril) 25 mg daily, which have provided effectiveness.  Patient has a PMHX of Hypertension, Migraines, ANGELICA with use of CPAP, hypogonadism, ED, prostate disorder, Arthritis, gout, lumbar back surgery, peripheral neuropathy (BLE) and allergic rhinitis.    Patient goal of treatment \"just to feel somewhat normal, and have some emotion, right now I feel I am just here.\"    Patient was with an [facility] provider for approximately 1.5 yrs, last seen via " "video approximately 1 month ago, was not happy with care due to frequent medication changes.  As patient reports taking 4 different medications at the same time, and is unable to determine their effectiveness.     Patient reports having thoughts of a truck hitting him while driving, feels worthless at times.  Denies suicidal planning, thoughts of action plan, or rumination.  \"That family would be better off dead, negative thoughts start to spiral through, and over thinks meaning how much better would my family be if this truck lost control.\"  Once patient is around his 3 kids or is busy at work he is able to alleviate negative thoughts.  These thoughts have occurred once every several months.      Wife tells patient he is emotionless, which has been going on for approximately 6-8 months.  Patient recalls while family was bowling, family was having fun, showing emotion, though patient had no expression or emotion to surroundings.      Patient is certain he is taking Cymbalta 90 mg in the morning, Hydroxyzine 25 mg daily in the morning is allowed to take up to 3 times daily,  Zoloft 50 mg in the morning.  The Klonopin 0.5 mg was filled 22 for 15 tabs, and last took yesterday due to elevated stress and prior was at least 2-3 months.  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      In regards to anxiety, \"it is out of nowhere\", will feel discomfort to chest, anxiousness, feeling soa, which is typically felt after arrival to work which is approximately 10 minutes after arising. Though has felt symptoms in the past without being work related.  Patient unable to identify specific triggers of anxiety.  Patient will try to calm self by listening to music alone in truck, distractions which seems to help if anxiety is not as bad.   And if not relieved will " only take half of the Klonopin 0.5 mg which is effective, as patient try's to not take unless absolutely necessary. Patient believes he has 8-9 tabs of Klonopin remaining.  Patient denies use of as needed Hydroxyzine in place of Klonopin in the past.     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 Patient does suffer from arthritis. At times patient does experience blurred vision, which is intermittent. Denies use of prescription lenses or contacts or recent eye exam. Left hand does shake with fine motor skills such as writing, which patient recently noticed over the last few months.     Cymbalta for approximately 3 yrs, had been effective, however, per pharmacy record patient provided today, dose was increased from 60 mg to 90 mg 8/30/22.  And Zoloft 50 mg started 9/19/22.  Nerve pain to ble has been managed well with Cymbalta.  Patient occasionally takes another antihistamine a few weeks ago due to allergy season, otherwise does not take routinely or past start of spring.  Denies difficulty with sleep since starting using CPAP.  Denies day time drowsiness.  Confirmed patient is no longer taking Prozac, Rexulti, Buspar, or Effexor XR.      Depression: Patient complains of depression. He complains of anhedonia, depressed mood, difficulty concentrating, feelings of worthlessness/guilt and appetite changes.         Anxiety: The patient endorses significant symptoms of anxiety including:  anxioiusness, feeling on edge, , restlessness or feeling keyed up, being easily fatigued, difficulty concentrating or mind going blank, irritability, muscle tension and sleep disturbance which have caused impairment in important areas of daily functioning.  The patient has had symptoms of anxiety for over 20 yrs, which have worsened over the last 8-9 months.       PHQ-9 Depression Screening  PHQ-9 Total Score:   10/29/2023 6     Little interest or  pleasure in doing things?     Feeling down, depressed, or hopeless?     Trouble falling or staying asleep, or sleeping too much?     Feeling tired or having little energy?     Poor appetite or overeating?     Feeling bad about yourself - or that you are a failure or have let yourself or your family down?     Trouble concentrating on things, such as reading the newspaper or watching television?     Moving or speaking so slowly that other people could have noticed? Or the opposite - being so fidgety or restless that you have been moving around a lot more than usual?     Thoughts that you would be better off dead, or of hurting yourself in some way?     PHQ-9 Total Score       BERNICE-7    10/29/2023  5    Past Surgical History:  Past Surgical History:   Procedure Laterality Date    BACK SURGERY      x 2       Problem List:  Patient Active Problem List   Diagnosis    Anxiety    Depressive disorder    History of colonic polyps    Hyperglycemia    Hyperlipidemia    Low back pain    Pain in soft tissues of limb    Thoracic spondylosis with myelopathy       Allergy:   No Known Allergies     Discontinued Medications:  Medications Discontinued During This Encounter   Medication Reason    atorvastatin (LIPITOR) 20 MG tablet Patient Reported Not Taking    clonazePAM (KlonoPIN) 0.5 MG tablet Patient Reported Not Taking    hydrOXYzine (ATARAX) 25 MG tablet Patient Reported Not Taking         Current Medications:   Current Outpatient Medications   Medication Sig Dispense Refill    DULoxetine (CYMBALTA) 30 MG capsule Take 3 capsules by mouth Every Morning AND 1 capsule Every Night. Do all this for 270 days. Indications: Disease of the Peripheral Nerves, Generalized Anxiety Disorder, Major Depressive Disorder 360 capsule 2    tamsulosin (FLOMAX) 0.4 MG capsule 24 hr capsule Take 1 capsule by mouth Daily.       No current facility-administered medications for this visit.       Past Medical History:  Past Medical History:   Diagnosis  "Date    Anxiety     Chronic pain disorder     Depression     Obsessive-compulsive disorder     Panic disorder     Peripheral neuropathy        Past Psychiatric History:  Began Treatment: 20 yrs ago prior to getting , though dealt with symptoms since childhood  Diagnoses:Depression, Anxiety and OCD, panic disorder  Psychiatrist: DELMY Alamo with [facility] 1.5 yrs, last seen 3/2022  Therapist: 2- one had about 6 visits approx 15 yrs ago; and 8 yrs ago seen a lady in [place]  Admission History:Denies  Medication Trials: several changes while at [facility], \"certain one's i took in the morning and i couldn't get out of truck, like i was froze, worrying\"added several meds to Cymbalta; Effexor XR, Buspar was unable to keep up with overlapping med orders; Rexulti, Prozac, and Zoloft (9/2022-4/18/23 stopped due to risk of serotonin syndrome wihile taking with Cymbalta)  Hydroxyzine-effective  Self Harm: Denies  Suicide Attempts:Denies      Substance Abuse History:   Types:Denies all, including illicit  Withdrawal Symptoms:Denies  Longest Period Sober:Not Applicable   AA: Not applicable     Social History:  Martial Status:  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, 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   Kids:Yes or If so, how many 3- age 13,10, and 7 as of 4/2023  House:Lives in a house   History: Denies  Access to Guns:  Yes, locked in safe    Social History     Socioeconomic History    Marital status:    Tobacco Use    Smoking status: Never    Smokeless tobacco: Current     Types: Snuff   Vaping Use    Vaping status: Never Used   Substance and Sexual Activity    Drug use: Never     Comment: only anything ever prescribed (but did one time fail drug test due to CBD drops not using currently)    Sexual activity: Yes       Family History:   Suicide Attempts: Denies  Suicide Completions:Denies      Family History   Problem Relation Age of Onset    Depression Mother     Anxiety disorder Mother     Alcohol abuse Father  "    Alcohol abuse Sister     Alcohol abuse Brother        Developmental History:   Born: [place]  Siblings:3 sisters, 1 brother  Childhood: Denies Abuse  High School:Completed  College: [employer] degree    Mental Status Exam:   Hygiene:   good  Cooperation:  Cooperative  Eye Contact:  Good  Psychomotor Behavior:  Appropriate  Affect:  Appropriate  Mood: euthymic    Speech:  Normal  Thought Process:  Goal directed  Thought Content:  Mood congruent  Suicidal:  None  Homicidal:  None  Hallucinations:  None  Delusion:  None  Memory:  Intact  Orientation:  Grossly intact  Reliability:  good  Insight:  Good  Judgement:  Good  Impulse Control:  Good  Physical/Medical Issues:  Yes Hypertension, Migraines, ANGELICA with CPAP, peripheral neuropathy, ED, Hypogonadism, prostate disorder, Arthritis, gout, lumbar back surgery, and allergic rhinitis.      Review of Systems:  Review of Systems   Constitutional:  Negative for fatigue.   HENT:  Negative for trouble swallowing.    Respiratory:  Positive for apnea. Negative for cough and shortness of breath.         BiPap   Cardiovascular:  Negative for chest pain and palpitations.   Gastrointestinal:  Negative for diarrhea and nausea.   Musculoskeletal:  Positive for arthralgias and back pain.   Neurological:  Positive for numbness. Negative for seizures.        Ble peripheral neuropathy   Psychiatric/Behavioral:  Negative for agitation, confusion, decreased concentration, hallucinations, self-injury, sleep disturbance and suicidal ideas. The patient is not nervous/anxious and is not hyperactive.          Physical Exam:  Physical Exam  Psychiatric:         Attention and Perception: Attention and perception normal.         Mood and Affect: Mood and affect normal.         Speech: Speech normal.         Behavior: Behavior normal. Behavior is cooperative.         Thought Content: Thought content normal. Thought content does not include suicidal ideation. Thought content does not include  "suicidal plan.         Cognition and Memory: Cognition and memory normal.         Judgment: Judgment normal.         Vital Signs:   /86   Pulse 104   Ht 190.5 cm (75\")   Wt 136 kg (299 lb)   BMI 37.37 kg/m²      Lab Results:   No visits with results within 6 Month(s) from this visit.   Latest known visit with results is:   No results found for any previous visit.       EKG Results:  No orders to display       Imaging Results:  No Images in the past 120 days found..      Assessment & Plan   Diagnoses and all orders for this visit:    1. Generalized anxiety disorder (Primary)    2. Word finding difficulty    3. MDD (major depressive disorder), recurrent episode, mild              Visit Diagnoses:    ICD-10-CM ICD-9-CM   1. Generalized anxiety disorder  F41.1 300.02   2. Word finding difficulty  R47.89 V40.1   3. MDD (major depressive disorder), recurrent episode, mild  F33.0 296.31             PLAN:  Safety: No acute safety concerns  Therapy: Declines due to inconsistent work schedule  Risk Assessment: Risk of self-harm acutely is moderate.  Risk factors include anxiety disorder, mood disorder, access to guns/weapons.  Protective factors include no family history, no present SI, no history of suicide attempts or self-harm in the past, minimal AODA, healthcare seeking, future orientation, willingness to engage in care.  Risk of self-harm chronically is also moderate, but could be further elevated in the event of treatment noncompliance and/or AODA.  Meds:  Continue Cymbalta (Duloxetine) 90 mg by mouth daily in the morning AND 30 mg nightly to target depression and anxiety as well as peripheral neuropathy.   Discussed all risks, benefits, alternatives, and side effects of Duloxetine (Cymbalta) including but not limited to GI upset, sexual dysfunction, bleeding risk, seizure risk, weight loss, insomnia, diaphoresis, drowsiness, headache, dizziness, fatigue, activation of kimmie or hypomania, increased fragility " fracture risk, hyponatremia, increased BP, hepatotoxicity, ocular effects, withdrawal syndrome following abrupt discontinuation, serotonin syndrome, and activation of suicidal ideation and behavior.   Discussed the need for patient to immediately call the office for any new or worsening symptoms, such as worsening depression; feeling nervous or restless; suicidal thoughts or actions; or other changes in mood or behavior, and all other concerns. Patient educated on medication compliance and the risks of suddenly stopping this medication or missing doses. Patient verbalized understanding and is agreeable to taking Duloxetine. Addressed all questions and concerns.     Removed Hydroxyzine (Atarax) as patient no longer taking.     5. Labs: n/a    Symptoms of anxiety, depression are under good control with current medication regimen.  New symptoms of difficulty with word finding while in conversations in random settings which has worsened over time, etiology is uncertain.  Patient compliant with BiPap, sleeping well, has been on current dose of Cymbalta since 6/2023, denies anxiousness, recent bouts of flu and Covid are potential reasons, however, duration of difficulty with word finding began beforehand.  Unable to locate specific dates of viral illness, advised patient to have routine labs with upcoming appointment with PCP for annual well visit.  And patient is to discuss further with PCP, and to contact provider if any unknown findings are found.  Patient will need annual labs including but not limited to , CBC, CMP, Thyroid panel with TSH, Hepatic Profile, Lipid profile, Iron Profile, Vitamin D-25 hydroxy, Vitamin B-12, Folate, and others the PCP may advise.   Patient was given instructions and counseling regarding condition and for health maintenance advice. Please see specific information pulled into the AVS if appropriate.    Patient to contact provider if symptoms worsen or fail to improve.       10/30/23:  -Continue Cymbalta (Duloxetine) 90 mg by mouth daily in the morning AND 30 mg nightly to target depression and anxiety as well as peripheral neuropathy. Refilled today 90 days with 2 refills.   -Continue Hydroxyzine (Atarax) 25 mg by mouth 3 times daily as needed to target anxiety, panic attacks.     Symptoms of anxiety, depression are under good control with current medication regimen.    Patient was given instructions and counseling regarding condition and for health maintenance advice. Please see specific information pulled into the AVS if appropriate.    Patient to contact provider if symptoms worsen or fail to improve.        7/31/23:   Therapy: Declines due to inconsistent work schedule  -Continue Cymbalta (Duloxetine) 90 mg by mouth daily in the morning AND 30 mg nightly to target depression and anxiety as well as peripheral neuropathy.  NR needed today  -Continue Hydroxyzine (Atarax) 25 mg by mouth 3 times daily as needed to target anxiety, panic attacks. Instructed to not take any other Antihistamine/Allergy medication such as but not limited to:  Zyrtec, Claritin, Allegra, or Benadryl within 4 hrs of taking Hydroxyzine.  Denies drowsiness with once daily dose every morning, has not required more than once daily.  -Patient instructed to request refill when appropriate from pharmacy or via [facility]. Cymbalta prescription will end 9/7/23, may request up to 7 days prior.     Symptoms of anxiety with panic attacks and depression are under good control with current medication regimen.  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   Patient was given instructions and counseling regarding condition and for health maintenance advice. Please see specific information  "pulled into the AVS if appropriate.    Patient to contact provider if symptoms worsen or fail to improve.      6/8/23: TELEPHONE  Patient called back today.  Patient said the entire day yesterday he was crying and sleeping.  Patient reports today he is not crying but still very depressed \"feels like I have a pit in my stomach and I feel very anxious.\" I explained to patient that if he is having SI to immediately to go to the ER so he may be evaluated\" Patient voiced understanding.  I asked patient if he would like me to send the message to a Provider who is in the [place] office patient stated No just send a message to Luz Elena and have her call me later.\" Patient denies plans to act on the SI.   PLEASE CALL.    Returned call to patient as requested patient reports this past Wed. Had a horrible day, and the last 2 days has been feeling better, no longer crying, still has knot in stomach, was worried due to having SI Tues night until Wed. \"It just crossed my mind, I wasn't acting on it or anything.\" Explains waking up Wed. Not feeling right, had to drive to [place] for a job and cried the entire way and ended up not being able to work and went to mom's and dad's and slept the rest of the day.  In regards to blood pressure, patient reports wife has checked and readings have been 124/84. And checks every 2 days. Patient is agreeable to add Cymbalta 30 mg at night and have wife continue to check blood pressure, patient reports having extra Cymbalta 30 mg on hand.  Instructed patient to continue 90 mg of Cymbalta in the morning and add 30 mg at night or he may take in the evening, patient verbalized understanding, updated order to reflect accuracy in EHR as a NO PRINT.  Patient to contact provider if symptoms worsen or fail to improve.  And if blood pressure readings elevate.     6/7/23: TELEPHONE  Patient LMVM that his depression has \"really increased and I am beginning to have some SI.  Please advise  Tried " returning call to patient but phone continually rang no answer and no voicemail option.    5/30/23:   -Continue Cymbalta (Duloxetine) 90 mg by mouth daily in the morning to target depression and anxiety as well as peripheral neuropathy.    -Continue Hydroxyzine (Atarax) 25 mg by mouth 3 times daily as needed to target anxiety, panic attacks. Denies drowsiness with once daily dose every morning, has not required more than once daily.  Patient instructed to Start tracking symptoms with date, time, symptoms experiencing, potential cause-what were you doing at the time of the sudden symptoms you experienced if you can recall.  How were you able to stop symptoms? And have wife check blood pressure at least once weekly.     Symptoms of anxiety, panic attacks, and depression are under fair control with current medication regimen.  Due to duration of current symptoms expressed today and elevated blood pressure, will have patient start tracking symptoms, have wife check blood pressure at least once weekly and report with next visit.  Patient advised to contact provider if symptoms are occurring more often than not, as we discussed adding Cymbalta 30 mg nightly, though would like to see blood pressure more stable and symptom tracking before proceeding, as patient was informed if blood pressure did continue to elevate would have to stop the 30 mg dose.   Patient to contact provider if symptoms worsen or fail to improve.       4/18/23:   Safety: No acute safety concerns  Therapy: Declines due to inconsistent work schedule  Continue Cymbalta (Duloxetine) 90 mg by mouth daily in the morning to target depression and anxiety as well as peripheral neuropathy.   Discussed all risks, benefits, alternatives, and side effects of Duloxetine (Cymbalta) including but not limited to GI upset, sexual dysfunction, bleeding risk, seizure risk, weight loss, insomnia, diaphoresis, drowsiness, headache, dizziness, fatigue, activation of kimmie or  hypomania, increased fragility fracture risk, hyponatremia, increased BP, hepatotoxicity, ocular effects, withdrawal syndrome following abrupt discontinuation, serotonin syndrome, and activation of suicidal ideation and behavior.   Discussed the need for patient to immediately call the office for any new or worsening symptoms, such as worsening depression; feeling nervous or restless; suicidal thoughts or actions; or other changes in mood or behavior, and all other concerns. Patient educated on medication compliance and the risks of suddenly stopping this medication or missing doses. Patient verbalized understanding and is agreeable to taking Duloxetine. Addressed all questions and concerns.    Change Hydroxyzine (Vistaril) 25 mg by mouth daily TO Hydroxyzine (Atarax) 25 mg by mouth 3 times daily as needed to target anxiety, panic attacks due to increased risks of sedation with Vistaril.  Instructed to not take any other Antihistamine/Allergy medication such as but not limited to:  Zyrtec, Claritin, Allegra, or Benadryl within 4 hrs of taking Hydroxyzine.  Risks, benefits, alternatives discussed with patient including sedation, dizziness, fall risk, GI upset, and risk of increased CNS depression and elevated heart rate if taken with other antihistamines.  After discussion of these risks and benefits, the patient voiced understanding and agreed to proceed. Instructed patient to start keeping a small supply of Hydroxyzine in truck instead of Klonopin, which patient reported rare use.  Advised to no longer take Klonopin if Hydroxyzine effective along with coping strategies discussed today.     Stop Zoloft due to risks of serotonin syndrome in combination with Cymbalta, informed patient he may experience increased irritability and GI discomfort which should subside in a few days. If symptoms fail to improve contact provider.     Removed Prozac from medication profile as patient has not taken for some time.     Patient  instructed to bring AVS with current medication list to pharmacy upon picking up Cymbalta and Hydroxyzine for accuracy. Highlighted area to show pharmacy.   Coping mechanisms discussed with patient today as a way to gain control over feelings to prevent situation or panic attack from getting worse: grounding techniques using 5 senses (name 3 things you can see, smell, touch, etc.), slow paced breathing techniques- focus on door inhaling and exhaling at each corner, application of cold compress/ice pack/water on face or opening freezer door to allow cold air to be breathed in taking slow deep breaths, closing eyes and focus on positive thoughts.    Patient presentation seems most consistent with depression, anxiety, and panic attacks.  Reviewed medication dispense record from [facility] pharmacy 1/1/21-12/31/22, reconciled medications.   Patient to contact provider if symptoms worsen or fail to improve.      Patient screened positive for depression based on a PHQ-9 score of 6 on 10/29/2023. Follow-up recommendations include: Prescribed antidepressant medication treatment and Suicide Risk Assessment performed.         TREATMENT PLAN/GOALS: Continue supportive psychotherapy efforts and medications as indicated. Treatment and medication options discussed during today's visit. Patient acknowledged and verbally consented to continue with current treatment plan and was educated on the importance of compliance with treatment and follow-up appointments.    MEDICATION ISSUES:  HANSEL reviewed as expected.  Discussed medication options and treatment plan of prescribed medication as well as the risks, benefits, and side effects including potential falls, possible impaired driving and metabolic adversities among others. Patient is agreeable to call the office with any worsening of symptoms or onset of side effects. Patient is agreeable to call 911 or go to the nearest ER should he/she begin having SI/HI. No medication side effects  or related complaints today.     MEDS ORDERED DURING VISIT:  No orders of the defined types were placed in this encounter.      Return in about 2 months (around 5/4/2024) for medication check.         I spent 31 minutes caring for Naldo on this date of service. This time includes time spent by me in the following activities: preparing for the visit, performing a medically appropriate examination and/or evaluation, counseling and educating the patient/family/caregiver, referring and communicating with other health care professionals, documenting information in the medical record, care coordination, and scheduling .      This document has been electronically signed by DELMY Rodriguez  March 4, 2024 16:29 EST      Part of this note may be an electronic transcription/translation of spoken language to printed text using the Dragon Dictation System.

## 2024-03-04 NOTE — PATIENT INSTRUCTIONS
"1. Should you want to get in touch with your provider, DELMY Rodriguez, please contact MY Medical Assistant, Carlita, directly at [phone].  Recommend saving Carlita's direct number in phone as this is the PREFERRED & EASIEST way to get in contact with your provider.  Please leave a voice mail if you do not get an answer and she will return your call within 24 hrs. You will NOT be able to contact provider on [employer], as Behavioral Health Providers are restricted. YOU MUST CALL [phone]  If you need to speak with the on call provider after hours or on weekends, please Contact the [facility] ([phone]) and staff will be able to page the provider on call directly.     2.  In the event you need to cancel an appointment, please notify the office at least 24 hrs prior:   Contact **Carlita Medical Assistant at [facility] directly at [phone] or the [facility] ([phone])     3. MEDICATION REFILLS:  PLEASE CALL THE PHARMACY TO REQUEST ALL MEDICATION REFILLS or via [employer] TO ENSURE YOU ARE RECEIVING YOUR MEDICATIONS IN A TIMELY MANNER. The pharmacy or [employer] jase will send this request ELECTRONICALLY to the ordering provider.   IF YOU USE AN AUTOMATED SERVICE AT THE PHARMACY FOR REFILLS AND ARE TOLD THERE ARE \"NO REFILLS REMAINING\"   PLEASE CALL THE PHARMACY & SPEAK TO A LIVE PERSON TO VERIFY IT IS THE MOST UP TO DATE PRESCRIPTION ON FILE.    All new prescriptions will have a different number, therefore, if you were given refills for a medication today or at last visit it will not have the same number as the previous prescription.     4.  In the event you have personal crisis, contact the following crisis numbers: Suicide Prevention Hotline 1-978.368.6847 or *988, [facility] Helpline 8-220-451-GYGF; [facility] Emergency Room [phone]; text HELLO to 177479; or 911.  If you feel like harming yourself or others, call 911 right away.  You can call the 981 Suicide and Crisis " "Lifeline at  988   to speak with a counselor at the [facility], or you can connect with one using their online chat  .    5.  Never stop an antidepressant medicine without first talking to a healthcare provider. Suddenly stopping this type of medication can cause withdrawal symptoms.    6.  Counseling and talk therapy  Counseling or therapy teaches you new coping skills and more adaptive ways of thinking about problems. These tools can help you make positive changes. The benefits of counseling often last long after treatment sessions have stopped.    7.   We would appreciate your feedback, please scan the QRS code on the back of your appointment card (or see below) and complete a brief survey.  [place] location is still not available, so please click \"[place]\" location.  Thank you      SPECIFIC RECOMMENDATIONS:     1.      Medications discussed at this encounter:                   -  no changes    2.      Psychotherapy recommendations: . Declined     3.     Return to clinic: 2 months, Monday 5/6/24 at 4 pm    Please arrive at least 15 minutes before your scheduled appointment time to complete check in process.      IF you are scheduled for a [employer] VIDEO visit, YOU MUST COMPLETE THE \"E-CHECK IN\" PROCESS PRIOR TO BEGINNING THE VISIT, YOU WILL NO LONGER RECEIVE A PHONE CALL PRIOR TO ALL VIDEO VISITS; You may still complete the E-Check in for in office visits prior to appointment, you will receive multiple text/email reminders which will direct you further if needed.           "

## 2024-05-06 ENCOUNTER — OFFICE VISIT (OUTPATIENT)
Dept: BEHAVIORAL HEALTH | Facility: CLINIC | Age: 44
End: 2024-05-06
Payer: COMMERCIAL

## 2024-05-06 VITALS
WEIGHT: 288.6 LBS | SYSTOLIC BLOOD PRESSURE: 148 MMHG | HEIGHT: 75 IN | DIASTOLIC BLOOD PRESSURE: 88 MMHG | BODY MASS INDEX: 35.88 KG/M2 | HEART RATE: 97 BPM

## 2024-05-06 DIAGNOSIS — F41.1 GENERALIZED ANXIETY DISORDER WITH PANIC ATTACKS: Primary | ICD-10-CM

## 2024-05-06 DIAGNOSIS — F41.0 GENERALIZED ANXIETY DISORDER WITH PANIC ATTACKS: Primary | ICD-10-CM

## 2024-05-06 DIAGNOSIS — Z79.899 MEDICATION MANAGEMENT: ICD-10-CM

## 2024-05-06 PROCEDURE — 99214 OFFICE O/P EST MOD 30 MIN: CPT | Performed by: NURSE PRACTITIONER

## 2024-06-11 ENCOUNTER — TELEPHONE (OUTPATIENT)
Dept: BEHAVIORAL HEALTH | Facility: CLINIC | Age: 44
End: 2024-06-11
Payer: COMMERCIAL

## 2024-06-11 DIAGNOSIS — F41.1 GENERALIZED ANXIETY DISORDER WITH PANIC ATTACKS: Primary | ICD-10-CM

## 2024-06-11 DIAGNOSIS — F41.0 GENERALIZED ANXIETY DISORDER WITH PANIC ATTACKS: Primary | ICD-10-CM

## 2024-06-11 RX ORDER — HYDROXYZINE HYDROCHLORIDE 25 MG/1
25 TABLET, FILM COATED ORAL 3 TIMES DAILY PRN
Qty: 90 TABLET | Refills: 1 | Status: SHIPPED | OUTPATIENT
Start: 2024-06-11

## 2024-06-11 NOTE — TELEPHONE ENCOUNTER
"Returned patient's call to further inquire. Patient advises \"I about had a mental breakdown last night,panic attack and crying almost to the point where I thought I was going crazy!\" I asked patient if he felt suicidal or homicidal and advised him if that ever becomes the case to go to the Emergency Room to which patient replied \"Yes.\"  I asked patient if he knew what might have brought it on?  Patient said \"I was off work yesterday due to my back going out. And just the thought of going back to work and the stressors there I just lost it.\"  I told patient I would have Luz Elena call him or work him in somewhere today.  Patient verbalized understanding.  "

## 2024-06-11 NOTE — TELEPHONE ENCOUNTER
"Returned call to patient and patient reports having a mental breakdown last night, \"I felt like I was going crazy, heart was racing, chest was hurting.\" Yesterday patient was off work due to back problem, and was going to return today though upon the thought of returning to work last night began to experience symptoms.  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 Patient feels the anxiety was sudden.   Denies SI/HI, though feels worthless, \"like I don't have any value.\" Patient is agreeable to restart Hydroxyzine as it was effective in the past.  Patient instructed to contact provider no later than this Friday if symptoms worsen or fail to improve. Patient verbalized understanding.     Start Hydroxyzine 25 mg by mouth 3 times daily as needed to target anxiety, panic attacks.  Due to potential drowsiness, instructed patient to not drive, use machinery, or do anything that needs mental alertness until you know how this medication affects you.    Instructed to not take any other Antihistamine/Allergy medication such as but not limited to:  Zyrtec, Claritin, Allegra, or Benadryl within 4 hrs of taking Hydroxyzine.  Risks, benefits, alternatives discussed with patient including sedation, dizziness, fall risk, GI upset, and risk of increased CNS depression and elevated heart rate if taken with other antihistamines.  After discussion of these risks and benefits, the patient voiced understanding and agreed to proceed.  Patient did tolerate well in the past without reported drowsiness. New prescription sent to [facility].   "

## 2024-06-21 ENCOUNTER — TELEPHONE (OUTPATIENT)
Dept: BEHAVIORAL HEALTH | Facility: CLINIC | Age: 44
End: 2024-06-21
Payer: COMMERCIAL

## 2024-06-21 DIAGNOSIS — F41.1 GENERALIZED ANXIETY DISORDER WITH PANIC ATTACKS: ICD-10-CM

## 2024-06-21 DIAGNOSIS — F41.0 GENERALIZED ANXIETY DISORDER WITH PANIC ATTACKS: ICD-10-CM

## 2024-06-21 RX ORDER — HYDROXYZINE HYDROCHLORIDE 25 MG/1
37.5 TABLET, FILM COATED ORAL 3 TIMES DAILY PRN
Start: 2024-06-21

## 2024-06-21 NOTE — TELEPHONE ENCOUNTER
"Patient called to advise that the medication you gave him around June 11,2024 seem to be working but here the past couple of days it is Not Working.  Patient advises he left work yesterday and then today when he was getting ready to go to work he was \"just BLAH\" and could not go.  Patient is requesting a callback.    "

## 2024-06-21 NOTE — TELEPHONE ENCOUNTER
"Returned call to patient regarding Hydroxyzine no longer working, patient reports started to feel better last week did not have any anxiety, and yesterday while driving to work, \"felt very anxious, almost like a fear, I don't know why, I froze\" and turned around and came back home, and this morning while getting ready to go to work the same feeling came over him, and did not go to work today.  Reports taking Hydroxyzine 25 mg in the morning, at lunch, and at 7 pm, and did not feel feel drowsy.      Instructed patient to start taking 37.5 mg (1 and a half tablets of the 25 mg) by mouth 3 times daily as needed for anxiety, panic attacks, and if ineffective and not causing drowsiness, may increase further to 50 mg (2 tabs) 3 times daily as needed.  Patient instructed to contact provider if symptoms worsen or fail to improve, and if he is unable to go to work on Monday to contact provider so he can be scheduled for an appointment. Will provide a work note for today. Patient verbalized understanding and agreeable with plan.   "

## 2024-08-22 DIAGNOSIS — F33.0 MDD (MAJOR DEPRESSIVE DISORDER), RECURRENT EPISODE, MILD: ICD-10-CM

## 2024-08-22 DIAGNOSIS — F41.0 GENERALIZED ANXIETY DISORDER WITH PANIC ATTACKS: ICD-10-CM

## 2024-08-22 DIAGNOSIS — F41.1 GENERALIZED ANXIETY DISORDER WITH PANIC ATTACKS: ICD-10-CM

## 2024-08-22 RX ORDER — DULOXETIN HYDROCHLORIDE 30 MG/1
CAPSULE, DELAYED RELEASE ORAL
Qty: 360 CAPSULE | Refills: 2 | Status: SHIPPED | OUTPATIENT
Start: 2024-08-22

## 2024-08-22 NOTE — TELEPHONE ENCOUNTER
DULOXETINE HCL DR 30 MG CAP     Last ordered: 9 months ago (10/30/2023) by DELMY Rodriguez     Follow up 09/09/2024

## 2024-09-09 ENCOUNTER — OFFICE VISIT (OUTPATIENT)
Dept: BEHAVIORAL HEALTH | Facility: CLINIC | Age: 44
End: 2024-09-09
Payer: COMMERCIAL

## 2024-09-09 VITALS
SYSTOLIC BLOOD PRESSURE: 160 MMHG | HEIGHT: 75 IN | DIASTOLIC BLOOD PRESSURE: 86 MMHG | HEART RATE: 116 BPM | WEIGHT: 296.8 LBS | BODY MASS INDEX: 36.9 KG/M2

## 2024-09-09 DIAGNOSIS — F41.0 GENERALIZED ANXIETY DISORDER WITH PANIC ATTACKS: Primary | ICD-10-CM

## 2024-09-09 DIAGNOSIS — F41.1 GENERALIZED ANXIETY DISORDER WITH PANIC ATTACKS: Primary | ICD-10-CM

## 2024-09-09 DIAGNOSIS — F33.0 MDD (MAJOR DEPRESSIVE DISORDER), RECURRENT EPISODE, MILD: ICD-10-CM

## 2024-09-09 DIAGNOSIS — Z79.899 MEDICATION MANAGEMENT: ICD-10-CM

## 2024-09-09 PROCEDURE — 99214 OFFICE O/P EST MOD 30 MIN: CPT | Performed by: NURSE PRACTITIONER

## 2024-09-09 RX ORDER — LIDOCAINE 50 MG/G
1 PATCH TOPICAL EVERY 24 HOURS
COMMUNITY
Start: 2024-09-04 | End: 2024-09-14

## 2024-09-09 RX ORDER — CYCLOBENZAPRINE HCL 10 MG
TABLET ORAL
COMMUNITY
Start: 2024-08-21

## 2024-09-09 RX ORDER — ATORVASTATIN CALCIUM 20 MG/1
20 TABLET, FILM COATED ORAL DAILY
COMMUNITY
Start: 2024-08-21

## 2024-09-09 RX ORDER — ACETAMINOPHEN 500 MG
1000 TABLET ORAL
COMMUNITY
Start: 2024-09-04 | End: 2024-09-14

## 2024-09-09 RX ORDER — ASPIRIN 81 MG/1
81 TABLET ORAL
COMMUNITY

## 2024-09-09 NOTE — PATIENT INSTRUCTIONS
"1. Should you want to get in touch with your provider, DELMY Rodriguez, please contact MY Medical Assistant, Carlita, directly at [phone].  Recommend saving Carlita's direct number in phone as this is the PREFERRED & EASIEST way to get in contact with your provider.  Please leave a voice mail if you do not get an answer and she will return your call within 24 hrs. You will NOT be able to contact provider on [employer], as Behavioral Health Providers are restricted. YOU MUST CALL [phone]  If you need to speak with the on call provider after hours or on weekends, please Contact the [facility] ([phone]) and staff will be able to page the provider on call directly.     2.  In the event you need to cancel an appointment, please notify the office at least 24 hrs prior:   Contact **Carlita Medical Assistant at [facility] directly at [phone] or the [facility] ([phone])     3. MEDICATION REFILLS:  PLEASE CALL THE PHARMACY TO REQUEST ALL MEDICATION REFILLS or via [employer] TO ENSURE YOU ARE RECEIVING YOUR MEDICATIONS IN A TIMELY MANNER. The pharmacy or [employer] jase will send this request ELECTRONICALLY to the ordering provider.   IF YOU USE AN AUTOMATED SERVICE AT THE PHARMACY FOR REFILLS AND ARE TOLD THERE ARE \"NO REFILLS REMAINING\"   PLEASE CALL THE PHARMACY & SPEAK TO A LIVE PERSON TO VERIFY IT IS THE MOST UP TO DATE PRESCRIPTION ON FILE.    All new prescriptions will have a different number, therefore, if you were given refills for a medication today or at last visit it will not have the same number as the previous prescription.     Going forward any medications for your mental health including any previously prescribed by your primary care provider will now be managed by DELMY Rodriguez. Contact provider's MA INITIALLY when down to 5-7 days remaining to ensure new refill(s) will have this provider name on prescription for future refills.  If requested from current bottle, via " "mychart, or via pharmacy the request would be directed to that ordering provider. And to prevent confusion, inaccurate dosing, inaccurate medication refills, the management of psychotropic and/or mental health medications should only be ordered by this mental health provider.    4.  In the event you have personal crisis, contact the following crisis numbers: Suicide Prevention Hotline 1-575.283.3139 or *988, [facility] Helpline 9-131-779-NERIS; [facility] Emergency Room [phone]; text HELLO to 700814; or 911.  If you feel like harming yourself or others, call 911 right away.  You can call the [facility] Suicide and Crisis Lifeline at  988   to speak with a counselor at the lifeline, or you can connect with one using their online chat  .    5.  Never stop an antidepressant medicine without first talking to a healthcare provider. Suddenly stopping this type of medication can cause withdrawal symptoms.    6.  Counseling and talk therapy  Counseling or therapy teaches you new coping skills and more adaptive ways of thinking about problems. These tools can help you make positive changes. The benefits of counseling often last long after treatment sessions have stopped.    7.   We would appreciate your feedback, please scan the QRS code on the back of your appointment card (or see below) and complete a brief survey.  [place] location is still not available, so please click \"[place]\" location.  Thank you      SPECIFIC RECOMMENDATIONS:     1.      Medications discussed at this encounter:                   -  Patient instructed to request refills when appropriate, when down to 5-7 days remaining via pharmacy or via MyChart.       2.      Psychotherapy recommendations:  Declined     3.     Return to clinic: 3 months, Monday 12/9/24 at 4pm     Please arrive at least 15 minutes before your scheduled appointment time to complete check in process.      IF you are scheduled for a MyChart VIDEO visit, YOU MUST COMPLETE THE " "\"E-CHECK IN\" PROCESS PRIOR TO BEGINNING THE VISIT, You may still complete the E-Check in for in office visits prior to appointment, you will receive multiple text/email reminders which will direct you further if needed.           "

## 2024-09-09 NOTE — PROGRESS NOTES
"  Subjective   Naldo Womack is a 44 y.o. male who presents today for follow up    Referring Provider:  No referring provider defined for this encounter.    Chief Complaint:  anxiety with panic attacks, depression, medication management     Answers submitted by the patient for this visit:  Primary Reason for Visit (Submitted on 9/2/2024)  What is the primary reason for your visit?: Depression  Depression (Submitted on 9/2/2024)  Chief Complaint: Depression  Visit: follow-up  Frequency: occasionally  Severity: moderate  excessive worry: No  insomnia: No  irritability: Yes  malaise/fatigue: Yes  obsessions: No  hypersomnia: Yes  difficulty controlling mood: Yes  Medication compliance: %  Side effects: confusion, fatigue  Aggravated by: family issues, work stress    History of Present Illness:    9/9/24:  Patient presents today in office reports having \"back issues\" due to pain, noted elevated heart rate and blood pressure.     Patient has not used the Hydroxyzine for approximately 3 weeks, reports taking 2 in the morning and 1 in the afternoon and after 2 weeks ws only taking 1 in the morning and afternoon, then the following week did not have to take. The week patient had called was the 1 yr anniversary of father's stroke.  Patient had 2 uncles passed away within the last year.  Father had his stroke on 6/17/24.  \"I am feeling great now.\"    Patient has also changed positions at employer which has alleviated stress, which has also helped with anxiety level.   Patient plans to utilize lidocaine patches when he can't sleep due to severity of pain to back.     5/6/24: Patient presents today in office reports thinking he was going to be late as blood pressure 160/88 heart rate 97. Reports having 1 bad week, which patient contributes to wife being in a bad mood, as patient has learned he is influenced by her negative mood.  Was able to get through week, though had higher anxiety.   Patient has not made it to PCP " appointment due to patient having the Flu in Jan. COVID 2/2024, sinus infection in 3/2024, and 4/2024 had the flu again, and has been so busy at work.  Patient feels difficulty with word finding is rare, maybe once a month, and feels symptoms may have been related to post COVID.     Continues to do well with Cymbalta dose.  Recheck blood pressure 148/88.   Depression: denies symptoms  Anxiety:  The patient endorses significant symptoms of anxiety including: excessive anxiety and worry about a number of events or activities for more days than not, being easily fatigued, difficulty concentrating or mind going blank, and irritability which have caused impairment in important areas of daily functioning.  Denies panic attacks.   Patient did feel somewhat anxious one day, and was able to concentrate on one thing and felt better after a few minutes. Patient has been able to identify triggers which has helped.     Patient 20th wedding anniversary is 8/7/24 and plan to go on a cruise though not scheduled yet.  Kids are going to be at different schools this year and is uncertain of their schedules.     3/4/24:   Answers submitted by the patient for this visit:  Primary Reason for Visit (Submitted on 3/3/2024)  What is the primary reason for your visit?: Other  Other (Submitted on 3/3/2024)  Please describe your symptoms.: Depression  Have you had these symptoms before?: Yes  How long have you been having these symptoms?: Greater than 2 weeks    Patient presents today in office.  No longer taking Hydroxyzine as patient has not had any panic attacks. Patient reports in Jan. 2024 had 2 days of feeling anxious though easily subsided.  Patient noticed while in conversation with other co-workers will forget words or names, even if family members, cannot recall name but can visualize their face, it used to happen minimally though has noticed more often the last few months.  Used to occur once a month in the past, now happening 5-6  "times per day.  \"My mind will go blank for that one word.\"   Patient reports sleeping well with BiPap, able to wake up before alarm for the last 4 months, and is ready to get up and proceed with day. Patient denies other related memory problems.    Patient feels work and family load has lightened up, has a third of the stress he used to have in the summer time.   Patient had flu A, flu B, and Covid this year which were treated in [facility], believes was seen 2/6/24 and in 1/2024.   Patient recalls having difficulty in Dec before transmission of COVID, has had Covid (March 2023)a few times though minimal symptoms, coughs and chills. Patient is due for annual PCP visit and plans to get labs and see provider in the next few weeks.   Years ago would have difficulty if had to speak in front of large crowd, though otherwise has not been occurring until more frequently the last few months.     Depression and anxiety are fairly well controlled, denies new stressors, anxiousness, life changes.  Denies other memory difficulties.     10/30/23:    Answers submitted by the patient for this visit:  Primary Reason for Visit (Submitted on 10/23/2023)  What is the primary reason for your visit?: Other  Other (Submitted on 10/23/2023)  Please describe your symptoms.: Depression  Have you had these symptoms before?: Yes  How long have you been having these symptoms?: Greater than 2 weeks    Patient presents today in office \"everything is working very well. The last month has had more stressors.  As father got COVID, was in the hospital, suffered pneumonia, and heart attack, and was able to go home and passed away at home after all children and grandchildren visited, and passed away after being home 3 days.  Family thought he was doing well and didn't have hosparus after discharge.  Patient father had a stroke before in July and had minimal movement to right side, though when had COVID father declined further.  Overall patient feels he is " "coping well.      Reports continues to take Hydroxyzine 25 mg once daily though during  did take one before proceedings which helped. Patient feels Cymbalta has been effective, blood pressure today lower than past, overall feels anxiety and depression are well controlled.  Denies further panic attacks.    Depression: Patient complains of depression. He complains of depressed mood, difficulty concentrating, fatigue, feelings of worthlessness/guilt, insomnia, and weight gain  Anxiety:  The patient endorses significant symptoms of anxiety including: restlessness or feeling keyed up, being easily fatigued, difficulty concentrating or mind going blank, irritability, and sleep disturbance which have caused impairment in important areas of daily functioning.     23:   Answers submitted by the patient for this visit:  Primary Reason for Visit (Submitted on 7/10/2023)  What is the primary reason for your visit?: Other  Other (Submitted on 7/10/2023)  Please describe your symptoms.: Depression  Have you had these symptoms before?: Yes  How long have you been having these symptoms?: Greater than 2 weeks    Patient presents today in office with a blood pressure 164/104 today, reports wife has been checking at home at least 2 times per week 130's/80's. 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  Denies headaches, chest pain, soa, dizziness, nor sweating.     Instructed patient to continue 90 mg of Cymbalta in the morning and add 30 mg at night or he may take in the evening on 23 at last appointment, which patient reports tolerating well, noted effectiveness.    Patient went on vacation last week with family and received positive feedback, \"I was more involved in conversation. I think there's been a big improvement, I haven't had any sadness.\" Patient also attended a graduation and was more " "engaged.     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 Mother is now back home. Family has alternated staying with mother.  Health concerns with father has been stressful.     Patient continues to take daily morning Hydroxyzine, has not required other use, keeps supply in truck, though has not needed. Denies panic attacks.     Recheck blood pressure manually to right arm 152/84, hr 94    5/30/23:  Patient presents today in office, \"The first 3 weeks coming off that medicine I had angry spells, up and down, but the last 2-3 days I have been crying for no reason.\" Patient was taken off Zoloft 50 mg at last visit due to also taking Cymbalta 90 mg.     At last visit Hydroxyzine (Vistaril) was changed from 25 mg by mouth daily TO Hydroxyzine (Atarax) 25 mg by mouth 3 times daily as needed to target anxiety, panic attacks due to increased risks of sedation with Vistaril.  Patient was also instructed patient to start keeping a small supply of Hydroxyzine in truck instead of Klonopin, which patient reported rare use.  Advised to no longer take Klonopin if Hydroxyzine effective along with coping strategies discussed today.   Patient has not required use as as needed, and takes daily in the morning,and keeps extra in vehicle which patient has not had to take.  Denies drowsiness.     Panic attacks: denies  Patient weight noted with a decrease of 7 lbs, however, patient reports weight fluctuates often, and believes prior weight may have been up due to work clothes and pockets with items.     Patient no longer having anger spells, currently feelings of emptiness. Denies external stressors or changes.  This past Saturday went out with family, and Sunday night went to a concert, which patient found guillermo.  \"It comes out of nowhere, it hits me all at once.\"  Patient will try to " "distract self rather than dwell, though sometimes unable to distract.  Reports more sadness, hopeless feeling, and starts to cry, rather than anxiousness.        4/18/23:  INITIAL EVAL  Patient presents today in office with a history of anxiety and depression for which treatment began approximately 20 yrs ago.  Patient is currently prescribed Cymbalta 90 mg, Zoloft 50 mg, and Hydroxyzine (Vistaril) 25 mg daily, which have provided effectiveness.  Patient has a PMHX of Hypertension, Migraines, ANGELICA with use of CPAP, hypogonadism, ED, prostate disorder, Arthritis, gout, lumbar back surgery, peripheral neuropathy (BLE) and allergic rhinitis.    Patient goal of treatment \"just to feel somewhat normal, and have some emotion, right now I feel I am just here.\"    Patient was with an [facility] provider for approximately 1.5 yrs, last seen via video approximately 1 month ago, was not happy with care due to frequent medication changes.  As patient reports taking 4 different medications at the same time, and is unable to determine their effectiveness.     Patient reports having thoughts of a truck hitting him while driving, feels worthless at times.  Denies suicidal planning, thoughts of action plan, or rumination.  \"That family would be better off dead, negative thoughts start to spiral through, and over thinks meaning how much better would my family be if this truck lost control.\"  Once patient is around his 3 kids or is busy at work he is able to alleviate negative thoughts.  These thoughts have occurred once every several months.      Wife tells patient he is emotionless, which has been going on for approximately 6-8 months.  Patient recalls while family was bowling, family was having fun, showing emotion, though patient had no expression or emotion to surroundings.      Patient is certain he is taking Cymbalta 90 mg in the morning, Hydroxyzine 25 mg daily in the morning is allowed to take up to 3 times daily,  Zoloft 50 mg in " "the morning.  The Klonopin 0.5 mg was filled 22 for 15 tabs, and last took yesterday due to elevated stress and prior was at least 2-3 months.  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      In regards to anxiety, \"it is out of nowhere\", will feel discomfort to chest, anxiousness, feeling soa, which is typically felt after arrival to work which is approximately 10 minutes after arising. Though has felt symptoms in the past without being work related.  Patient unable to identify specific triggers of anxiety.  Patient will try to calm self by listening to music alone in truck, distractions which seems to help if anxiety is not as bad.   And if not relieved will only take half of the Klonopin 0.5 mg which is effective, as patient try's to not take unless absolutely necessary. Patient believes he has 8-9 tabs of Klonopin remaining.  Patient denies use of as needed Hydroxyzine in place of Klonopin in the past.     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 Patient does suffer from arthritis. At times patient does experience blurred vision, which is intermittent. Denies use of prescription lenses or contacts or recent eye exam. Left hand does shake with fine motor skills such as writing, which patient recently noticed over the last few months.     Cymbalta for approximately 3 yrs, had been effective, however, per pharmacy record patient provided today, dose was increased from 60 mg to 90 mg 22.  And Zoloft 50 mg started 22.  Nerve pain to ble has been managed well with Cymbalta.  Patient occasionally takes another antihistamine a few weeks ago due to allergy season, otherwise does not take routinely or past start of spring.  Denies difficulty with sleep since " starting using CPAP.  Denies day time drowsiness.  Confirmed patient is no longer taking Prozac, Rexulti, Buspar, or Effexor XR.      Depression: Patient complains of depression. He complains of anhedonia, depressed mood, difficulty concentrating, feelings of worthlessness/guilt and appetite changes.         Anxiety: The patient endorses significant symptoms of anxiety including:  anxioiusness, feeling on edge, , restlessness or feeling keyed up, being easily fatigued, difficulty concentrating or mind going blank, irritability, muscle tension and sleep disturbance which have caused impairment in important areas of daily functioning.  The patient has had symptoms of anxiety for over 20 yrs, which have worsened over the last 8-9 months.       PHQ-9 Depression Screening  PHQ-9 Total Score:   5/6/2024 3 (PHQ2-0)    Little interest or pleasure in doing things?     Feeling down, depressed, or hopeless?     Trouble falling or staying asleep, or sleeping too much?     Feeling tired or having little energy?     Poor appetite or overeating?     Feeling bad about yourself - or that you are a failure or have let yourself or your family down?     Trouble concentrating on things, such as reading the newspaper or watching television?     Moving or speaking so slowly that other people could have noticed? Or the opposite - being so fidgety or restless that you have been moving around a lot more than usual?     Thoughts that you would be better off dead, or of hurting yourself in some way?     PHQ-9 Total Score       BERNICE-7    5/6/2024 6    Past Surgical History:  Past Surgical History:   Procedure Laterality Date    BACK SURGERY      x 2       Problem List:  Patient Active Problem List   Diagnosis    Anxiety    Depressive disorder    History of colonic polyps    Hyperglycemia    Hyperlipidemia    Low back pain    Pain in soft tissues of limb    Thoracic spondylosis with myelopathy       Allergy:   No Known Allergies     Discontinued  "Medications:  There are no discontinued medications.        Current Medications:   Current Outpatient Medications   Medication Sig Dispense Refill    acetaminophen (TYLENOL) 500 MG tablet Take 2 tablets by mouth.      atorvastatin (LIPITOR) 20 MG tablet Take 1 tablet by mouth Daily.      DULoxetine (CYMBALTA) 30 MG capsule TAKE THREE CAPSULES BY MOUTH EVERY MORNING AND TAKE 1 CAPSULE BY MOUTH EVERY NIGHT 360 capsule 2    hydrOXYzine (ATARAX) 25 MG tablet Take 1.5 tablets by mouth 3 (Three) Times a Day As Needed for Anxiety (panic attacks). DO NOT take any other Antihistamine or Allergy medication within 4 hrs of taking Hydroxyzine. MAY INCREASE TO 50 MG IF 37.5 MG NOT EFFECTIVE & WITHOUT DROWSINESS  Indications: Feeling Anxious      lidocaine (LIDODERM) 5 % Place 1 patch on the skin as directed by provider Daily.      tamsulosin (FLOMAX) 0.4 MG capsule 24 hr capsule Take 1 capsule by mouth Daily.      aspirin 81 MG EC tablet Take 1 tablet by mouth. (Patient not taking: Reported on 9/9/2024)      cyclobenzaprine (FLEXERIL) 10 MG tablet One po qhs for muscle spasm. (Patient not taking: Reported on 9/9/2024)       No current facility-administered medications for this visit.       Past Medical History:  Past Medical History:   Diagnosis Date    Anxiety     Chronic pain disorder     Depression     Obsessive-compulsive disorder     Panic disorder     Peripheral neuropathy        Past Psychiatric History:  Began Treatment: 20 yrs ago prior to getting , though dealt with symptoms since childhood  Diagnoses:Depression, Anxiety and OCD, panic disorder  Psychiatrist: DELMY Alamo with [facility] 1.5 yrs, last seen 3/2022  Therapist: 2- one had about 6 visits approx 15 yrs ago; and 8 yrs ago seen a lady in [place]  Admission History:Denies  Medication Trials: several changes while at [facility], \"certain one's i took in the morning and i couldn't get out of truck, like i was froze, worrying\"added several meds to Cymbalta; " Effexor XR, Buspar was unable to keep up with overlapping med orders; Rexulti, Prozac, and Zoloft (9/2022-4/18/23 stopped due to risk of serotonin syndrome wihile taking with Cymbalta)  Hydroxyzine-effective  Self Harm: Denies  Suicide Attempts:Denies      Substance Abuse History:   Types:Denies all, including illicit  Withdrawal Symptoms:Denies  Longest Period Sober:Not Applicable   AA: Not applicable     Social History:  Martial Status:  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, 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   Kids:Yes or If so, how many 3- age 13,10, and 7 as of 4/2023  House:Lives in a house   History: Denies  Access to Guns:  Yes, locked in safe    Social History     Socioeconomic History    Marital status:     Number of children: 3   Tobacco Use    Smoking status: Never    Smokeless tobacco: Current     Types: Snuff   Vaping Use    Vaping status: Never Used   Substance and Sexual Activity    Drug use: Never     Comment: only anything ever prescribed (but did one time fail drug test due to CBD drops not using currently)    Sexual activity: Yes       Family History:   Suicide Attempts: Denies  Suicide Completions:Denies      Family History   Problem Relation Age of Onset    Depression Mother     Anxiety disorder Mother     Alcohol abuse Father     Alcohol abuse Sister     Alcohol abuse Brother        Developmental History:   Born: [place]  Siblings:3 sisters, 1 brother  Childhood: Denies Abuse  High School:Completed  College: [employer] degree    Mental Status Exam:   Hygiene:   good  Cooperation:  Cooperative  Eye Contact:  Good  Psychomotor Behavior:  Appropriate  Affect:  Appropriate  Mood: euthymic    Speech:  Normal  Thought Process:  Goal directed  Thought Content:  Mood congruent  Suicidal:  None  Homicidal:  None  Hallucinations:  None  Delusion:  None  Memory:  Intact  Orientation:  Grossly intact  Reliability:  good  Insight:  Good  Judgement:  Good  Impulse Control:   "Good  Physical/Medical Issues:  Yes Hypertension, Migraines, ANGELICA with CPAP, peripheral neuropathy, ED, Hypogonadism, prostate disorder, Arthritis, gout, lumbar back surgery, and allergic rhinitis.      Review of Systems:  Review of Systems   Constitutional:  Negative for fatigue.   HENT:  Negative for trouble swallowing.    Respiratory:  Positive for apnea. Negative for cough and shortness of breath.         BiPap   Cardiovascular:  Negative for chest pain and palpitations.   Gastrointestinal:  Negative for diarrhea and nausea.   Musculoskeletal:  Positive for arthralgias and back pain.   Neurological:  Positive for numbness. Negative for dizziness and seizures.        Ble peripheral neuropathy   Psychiatric/Behavioral:  Negative for agitation, confusion, decreased concentration, hallucinations, self-injury, sleep disturbance and suicidal ideas. The patient is not nervous/anxious and is not hyperactive.          Physical Exam:  Physical Exam  Psychiatric:         Attention and Perception: Attention and perception normal.         Mood and Affect: Mood and affect normal.         Speech: Speech normal.         Behavior: Behavior normal. Behavior is cooperative.         Thought Content: Thought content normal. Thought content does not include suicidal ideation. Thought content does not include suicidal plan.         Cognition and Memory: Cognition and memory normal.         Judgment: Judgment normal.         Vital Signs:   /86   Pulse 116   Ht 190.5 cm (75\")   Wt 135 kg (296 lb 12.8 oz)   BMI 37.10 kg/m²      Office notes from care team reviewed:  Date of Service: 8/21/24 OV with JERRICA Padilla   Date of Service: 9/4/24 [facility] ED with        Lab Results:   No visits with results within 6 Month(s) from this visit.   Latest known visit with results is:   No results found for any previous visit.       EKG Results:  No orders to display       Imaging Results:  No Images in the past 120 days " found..      Assessment & Plan   Diagnoses and all orders for this visit:    1. Generalized anxiety disorder with panic attacks (Primary)    2. MDD (major depressive disorder), recurrent episode, mild    3. Medication management        Visit Diagnoses:    ICD-10-CM ICD-9-CM   1. Generalized anxiety disorder with panic attacks  F41.1 300.02    F41.0 300.01   2. MDD (major depressive disorder), recurrent episode, mild  F33.0 296.31   3. Medication management  Z79.899 V58.69                 PLAN:  Safety: No acute safety concerns  Therapy: Declines   Risk Assessment: Risk of self-harm acutely is moderate.  Risk factors include anxiety disorder, mood disorder, access to guns/weapons.  Protective factors include no family history, no present SI, no history of suicide attempts or self-harm in the past, minimal AODA, healthcare seeking, future orientation, willingness to engage in care.  Risk of self-harm chronically is also moderate, but could be further elevated in the event of treatment noncompliance and/or AODA.  Meds:  -Continue Cymbalta (Duloxetine) 90 mg by mouth daily in the morning AND 30 mg nightly to target depression and anxiety as well as peripheral neuropathy.    -Continue Hydroxyzine 25-50 mg by mouth 3 times daily as needed to target anxiety. Has not required in the last 3 weeks. No refills needed at this time.     5. Labs: n/a    Symptoms of anxiety, depression are under good control with current medication regimen.  Elevated blood pressure and heart rate related to chronic back pain. Overall mood is stable.   The plan was discussed with the patient. The patient was given time to ask questions and these questions were answered. At the conclusion of their visit they had no additional questions or concerns and all questions were answered to their satisfaction.   Patient was given instructions and counseling regarding condition and for health maintenance advice. Please see specific information pulled into the  "AVS if appropriate.    Patient to contact provider if symptoms worsen or fail to improve.        6/21/24:  Telephone  -Patient called to advise that the medication you gave him around June 11,2024 seem to be working but here the past couple of days it is Not Working.  Patient advises he left work yesterday and then today when he was getting ready to go to work he was \"just BLAH\" and could not go.  Patient is requesting a callback.      -Returned call to patient regarding Hydroxyzine no longer working, patient reports started to feel better last week did not have any anxiety, and yesterday while driving to work, \"felt very anxious, almost like a fear, I don't know why, I froze\" and turned around and came back home, and this morning while getting ready to go to work the same feeling came over him, and did not go to work today.  Reports taking Hydroxyzine 25 mg in the morning, at lunch, and at 7 pm, and did not feel feel drowsy.      Instructed patient to start taking 37.5 mg (1 and a half tablets of the 25 mg) by mouth 3 times daily as needed for anxiety, panic attacks, and if ineffective and not causing drowsiness, may increase further to 50 mg (2 tabs) 3 times daily as needed.  Patient instructed to contact provider if symptoms worsen or fail to improve, and if he is unable to go to work on Monday to contact provider so he can be scheduled for an appointment. Will provide a work note for today. Patient verbalized understanding and agreeable with plan.     6/11/24:  TELEPHONE  Returned patient's call to further inquire. Patient advises \"I about had a mental breakdown last night,panic attack and crying almost to the point where I thought I was going crazy!\" I asked patient if he felt suicidal or homicidal and advised him if that ever becomes the case to go to the Emergency Room to which patient replied \"Yes.\"  I asked patient if he knew what might have brought it on?  Patient said \"I was off work yesterday due to my " "back going out. And just the thought of going back to work and the stressors there I just lost it.\"  I told patient I would have Luz Elena call him or work him in somewhere today.  Patient verbalized understanding.    -Returned call to patient and patient reports having a mental breakdown last night, \"I felt like I was going crazy, heart was racing, chest was hurting.\" Yesterday patient was off work due to back problem, and was going to return today though upon the thought of returning to work last night began to experience symptoms.  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 Patient feels the anxiety was sudden.   Denies SI/HI, though feels worthless, \"like I don't have any value.\" Patient is agreeable to restart Hydroxyzine as it was effective in the past.  Patient instructed to contact provider no later than this Friday if symptoms worsen or fail to improve. Patient verbalized understanding.     Start Hydroxyzine 25 mg by mouth 3 times daily as needed to target anxiety, panic attacks.  Due to potential drowsiness, instructed patient to not drive, use machinery, or do anything that needs mental alertness until you know how this medication affects you.    Instructed to not take any other Antihistamine/Allergy medication such as but not limited to:  Zyrtec, Claritin, Allegra, or Benadryl within 4 hrs of taking Hydroxyzine.  Risks, benefits, alternatives discussed with patient including sedation, dizziness, fall risk, GI upset, and risk of increased CNS depression and elevated heart rate if taken with other antihistamines.  After discussion of these risks and benefits, the patient voiced understanding and agreed to proceed.  Patient did tolerate well in the past without reported drowsiness. New prescription sent to [facility].     5/6/24:   -Continue Cymbalta (Duloxetine) 90 mg by mouth daily in the morning AND 30 mg nightly to target depression and anxiety as well as " peripheral neuropathy.   Patient instructed to request refills when appropriate, when down to 5-7 days remaining via  pharmacy or via MyChart.       Symptoms of anxiety, depression are under good control with current medication regimen.    Patient was given instructions and counseling regarding condition and for health maintenance advice. Please see specific information pulled into the AVS if appropriate.    Patient to contact provider if symptoms worsen or fail to improve.        3/4/24:   -Continue Cymbalta (Duloxetine) 90 mg by mouth daily in the morning AND 30 mg nightly to target depression and anxiety as well as peripheral neuropathy.   -Removed Hydroxyzine (Atarax) as patient no longer taking.     Symptoms of anxiety, depression are under good control with current medication regimen.  New symptoms of difficulty with word finding while in conversations in random settings which has worsened over time, etiology is uncertain.  Patient compliant with BiPap, sleeping well, has been on current dose of Cymbalta since 6/2023, denies anxiousness, recent bouts of flu and Covid are potential reasons, however, duration of difficulty with word finding began beforehand.  Unable to locate specific dates of viral illness, advised patient to have routine labs with upcoming appointment with PCP for annual well visit.  And patient is to discuss further with PCP, and to contact provider if any unknown findings are found.  Patient will need annual labs including but not limited to , CBC, CMP, Thyroid panel with TSH, Hepatic Profile, Lipid profile, Iron Profile, Vitamin D-25 hydroxy, Vitamin B-12, Folate, and others the PCP may advise.   Patient was given instructions and counseling regarding condition and for health maintenance advice. Please see specific information pulled into the AVS if appropriate.    Patient to contact provider if symptoms worsen or fail to improve.      10/30/23:  -Continue Cymbalta (Duloxetine) 90 mg by  mouth daily in the morning AND 30 mg nightly to target depression and anxiety as well as peripheral neuropathy. Refilled today 90 days with 2 refills.   -Continue Hydroxyzine (Atarax) 25 mg by mouth 3 times daily as needed to target anxiety, panic attacks.     Symptoms of anxiety, depression are under good control with current medication regimen.    Patient was given instructions and counseling regarding condition and for health maintenance advice. Please see specific information pulled into the AVS if appropriate.    Patient to contact provider if symptoms worsen or fail to improve.        7/31/23:   Therapy: Declines due to inconsistent work schedule  -Continue Cymbalta (Duloxetine) 90 mg by mouth daily in the morning AND 30 mg nightly to target depression and anxiety as well as peripheral neuropathy.  NR needed today  -Continue Hydroxyzine (Atarax) 25 mg by mouth 3 times daily as needed to target anxiety, panic attacks. Instructed to not take any other Antihistamine/Allergy medication such as but not limited to:  Zyrtec, Claritin, Allegra, or Benadryl within 4 hrs of taking Hydroxyzine.  Denies drowsiness with once daily dose every morning, has not required more than once daily.  -Patient instructed to request refill when appropriate from pharmacy or via [employer]. Cymbalta prescription will end 9/7/23, may request up to 7 days prior.     Symptoms of anxiety with panic attacks and depression are under good control with current medication regimen.  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   Patient was given instructions and counseling regarding condition and for health maintenance advice. Please see specific information pulled into the AVS if appropriate.    Patient to contact  "provider if symptoms worsen or fail to improve.      6/8/23: TELEPHONE  Patient called back today.  Patient said the entire day yesterday he was crying and sleeping.  Patient reports today he is not crying but still very depressed \"feels like I have a pit in my stomach and I feel very anxious.\" I explained to patient that if he is having SI to immediately to go to the ER so he may be evaluated\" Patient voiced understanding.  I asked patient if he would like me to send the message to a Provider who is in the [place] office patient stated No just send a message to Luz Elena and have her call me later.\" Patient denies plans to act on the SI.   PLEASE CALL.    Returned call to patient as requested patient reports this past Wed. Had a horrible day, and the last 2 days has been feeling better, no longer crying, still has knot in stomach, was worried due to having SI Tues night until Wed. \"It just crossed my mind, I wasn't acting on it or anything.\" Explains waking up Wed. Not feeling right, had to drive to [place] for a job and cried the entire way and ended up not being able to work and went to mom's and dad's and slept the rest of the day.  In regards to blood pressure, patient reports wife has checked and readings have been 124/84. And checks every 2 days. Patient is agreeable to add Cymbalta 30 mg at night and have wife continue to check blood pressure, patient reports having extra Cymbalta 30 mg on hand.  Instructed patient to continue 90 mg of Cymbalta in the morning and add 30 mg at night or he may take in the evening, patient verbalized understanding, updated order to reflect accuracy in EHR as a NO PRINT.  Patient to contact provider if symptoms worsen or fail to improve.  And if blood pressure readings elevate.     6/7/23: TELEPHONE  Patient [facility] that his depression has \"really increased and I am beginning to have some SI.  Please advise  Tried returning call to patient but phone continually rang no answer " and no voicemail option.    5/30/23:   -Continue Cymbalta (Duloxetine) 90 mg by mouth daily in the morning to target depression and anxiety as well as peripheral neuropathy.    -Continue Hydroxyzine (Atarax) 25 mg by mouth 3 times daily as needed to target anxiety, panic attacks. Denies drowsiness with once daily dose every morning, has not required more than once daily.  Patient instructed to Start tracking symptoms with date, time, symptoms experiencing, potential cause-what were you doing at the time of the sudden symptoms you experienced if you can recall.  How were you able to stop symptoms? And have wife check blood pressure at least once weekly.     Symptoms of anxiety, panic attacks, and depression are under fair control with current medication regimen.  Due to duration of current symptoms expressed today and elevated blood pressure, will have patient start tracking symptoms, have wife check blood pressure at least once weekly and report with next visit.  Patient advised to contact provider if symptoms are occurring more often than not, as we discussed adding Cymbalta 30 mg nightly, though would like to see blood pressure more stable and symptom tracking before proceeding, as patient was informed if blood pressure did continue to elevate would have to stop the 30 mg dose.   Patient to contact provider if symptoms worsen or fail to improve.       4/18/23:   Safety: No acute safety concerns  Therapy: Declines due to inconsistent work schedule  Continue Cymbalta (Duloxetine) 90 mg by mouth daily in the morning to target depression and anxiety as well as peripheral neuropathy.   Discussed all risks, benefits, alternatives, and side effects of Duloxetine (Cymbalta) including but not limited to GI upset, sexual dysfunction, bleeding risk, seizure risk, weight loss, insomnia, diaphoresis, drowsiness, headache, dizziness, fatigue, activation of kimmie or hypomania, increased fragility fracture risk, hyponatremia,  increased BP, hepatotoxicity, ocular effects, withdrawal syndrome following abrupt discontinuation, serotonin syndrome, and activation of suicidal ideation and behavior.   Discussed the need for patient to immediately call the office for any new or worsening symptoms, such as worsening depression; feeling nervous or restless; suicidal thoughts or actions; or other changes in mood or behavior, and all other concerns. Patient educated on medication compliance and the risks of suddenly stopping this medication or missing doses. Patient verbalized understanding and is agreeable to taking Duloxetine. Addressed all questions and concerns.    Change Hydroxyzine (Vistaril) 25 mg by mouth daily TO Hydroxyzine (Atarax) 25 mg by mouth 3 times daily as needed to target anxiety, panic attacks due to increased risks of sedation with Vistaril.  Instructed to not take any other Antihistamine/Allergy medication such as but not limited to:  Zyrtec, Claritin, Allegra, or Benadryl within 4 hrs of taking Hydroxyzine.  Risks, benefits, alternatives discussed with patient including sedation, dizziness, fall risk, GI upset, and risk of increased CNS depression and elevated heart rate if taken with other antihistamines.  After discussion of these risks and benefits, the patient voiced understanding and agreed to proceed. Instructed patient to start keeping a small supply of Hydroxyzine in truck instead of Klonopin, which patient reported rare use.  Advised to no longer take Klonopin if Hydroxyzine effective along with coping strategies discussed today.     Stop Zoloft due to risks of serotonin syndrome in combination with Cymbalta, informed patient he may experience increased irritability and GI discomfort which should subside in a few days. If symptoms fail to improve contact provider.     Removed Prozac from medication profile as patient has not taken for some time.     Patient instructed to bring AVS with current medication list to  pharmacy upon picking up Cymbalta and Hydroxyzine for accuracy. Highlighted area to show pharmacy.   Coping mechanisms discussed with patient today as a way to gain control over feelings to prevent situation or panic attack from getting worse: grounding techniques using 5 senses (name 3 things you can see, smell, touch, etc.), slow paced breathing techniques- focus on door inhaling and exhaling at each corner, application of cold compress/ice pack/water on face or opening freezer door to allow cold air to be breathed in taking slow deep breaths, closing eyes and focus on positive thoughts.    Patient presentation seems most consistent with depression, anxiety, and panic attacks.  Reviewed medication dispense record from [facility] pharmacy 1/1/21-12/31/22, reconciled medications.   Patient to contact provider if symptoms worsen or fail to improve.      Patient screened positive for depression based on a PHQ-9 score of 3 on 5/6/2024. Follow-up recommendations include: Prescribed antidepressant medication treatment and Suicide Risk Assessment performed.(PHQ2-0)         TREATMENT PLAN/GOALS: Continue supportive psychotherapy efforts and medications as indicated. Treatment and medication options discussed during today's visit. Patient acknowledged and verbally consented to continue with current treatment plan and was educated on the importance of compliance with treatment and follow-up appointments.    MEDICATION ISSUES:  HANSEL reviewed as expected.  Discussed medication options and treatment plan of prescribed medication as well as the risks, benefits, and side effects including potential falls, possible impaired driving and metabolic adversities among others. Patient is agreeable to call the office with any worsening of symptoms or onset of side effects. Patient is agreeable to call 911 or go to the nearest ER should he/she begin having SI/HI. No medication side effects or related complaints today.     MEDS ORDERED  DURING VISIT:  No orders of the defined types were placed in this encounter.      Return in about 3 months (around 12/9/2024) for medication check.         I spent 25 minutes caring for Naldo on this date of service. This time includes time spent by me in the following activities: preparing for the visit, reviewing tests, obtaining and/or reviewing a separately obtained history, performing a medically appropriate examination and/or evaluation, counseling and educating the patient/family/caregiver, referring and communicating with other health care professionals, documenting information in the medical record, care coordination, and scheduling .      This document has been electronically signed by DELMY Rodriguez  September 9, 2024 16:28 EDT      Part of this note may be an electronic transcription/translation of spoken language to printed text using the Dragon Dictation System.

## 2024-12-09 ENCOUNTER — OFFICE VISIT (OUTPATIENT)
Dept: BEHAVIORAL HEALTH | Facility: CLINIC | Age: 44
End: 2024-12-09
Payer: COMMERCIAL

## 2024-12-09 VITALS
HEIGHT: 75 IN | DIASTOLIC BLOOD PRESSURE: 88 MMHG | SYSTOLIC BLOOD PRESSURE: 142 MMHG | WEIGHT: 302 LBS | BODY MASS INDEX: 37.55 KG/M2 | HEART RATE: 113 BPM

## 2024-12-09 DIAGNOSIS — R45.86 MOOD CHANGES: ICD-10-CM

## 2024-12-09 DIAGNOSIS — T38.7X5A ADVERSE EFFECT OF TESTOSTERONE, INITIAL ENCOUNTER: ICD-10-CM

## 2024-12-09 DIAGNOSIS — F41.0 GENERALIZED ANXIETY DISORDER WITH PANIC ATTACKS: Primary | ICD-10-CM

## 2024-12-09 DIAGNOSIS — F33.0 MDD (MAJOR DEPRESSIVE DISORDER), RECURRENT EPISODE, MILD: ICD-10-CM

## 2024-12-09 DIAGNOSIS — F41.1 GENERALIZED ANXIETY DISORDER WITH PANIC ATTACKS: Primary | ICD-10-CM

## 2024-12-09 DIAGNOSIS — Z71.89 MEDICATION CARE PLAN DISCUSSED WITH PATIENT: ICD-10-CM

## 2024-12-09 DIAGNOSIS — Z79.899 MEDICATION MANAGEMENT: ICD-10-CM

## 2024-12-09 PROCEDURE — 99214 OFFICE O/P EST MOD 30 MIN: CPT | Performed by: NURSE PRACTITIONER

## 2024-12-09 PROCEDURE — 96127 BRIEF EMOTIONAL/BEHAV ASSMT: CPT | Performed by: NURSE PRACTITIONER

## 2024-12-09 RX ORDER — TESTOSTERONE CYPIONATE 200 MG/ML
200 INJECTION, SOLUTION INTRAMUSCULAR
COMMUNITY
Start: 2024-11-12 | End: 2024-12-12

## 2024-12-09 RX ORDER — LISINOPRIL 10 MG/1
10 TABLET ORAL DAILY
COMMUNITY
Start: 2024-11-12 | End: 2025-11-12

## 2024-12-09 RX ORDER — HYDROXYZINE HYDROCHLORIDE 25 MG/1
25 TABLET, FILM COATED ORAL 3 TIMES DAILY PRN
Qty: 180 TABLET | Refills: 0 | Status: SHIPPED | OUTPATIENT
Start: 2024-12-09

## 2024-12-09 NOTE — PATIENT INSTRUCTIONS
"SPECIFIC RECOMMENDATIONS:     1.      Medications discussed at this encounter:                   -  Refilled Hydroxyzine     2.      Psychotherapy recommendations:Declined     3.     Return to clinic: 3 months Tues. 3/11/25 at 4pm     Please arrive at least 15 minutes before your scheduled appointment time to complete check in process.      IF you are scheduled for a MyChart VIDEO visit, YOU MUST COMPLETE THE \"E-CHECK IN\" PROCESS PRIOR TO BEGINNING THE VISIT, You may still complete the E-Check in for in office visits prior to appointment, you will receive multiple text/email reminders which will direct you further if needed.            "

## 2024-12-09 NOTE — PROGRESS NOTES
"  Subjective   Naldo Womack is a 44 y.o. male who presents today for follow up    Referring Provider:  No referring provider defined for this encounter.    Chief Complaint:  anxiety with panic attacks, depression, mood changes, adverse reaction to testosterone, medication management, medication care plan discussed      History of Present Illness:    12/9/24:  Patient presents today in office reports anxiety has been stable, though for the last 2-4 weeks have had worsen depressive symptoms, crying for no reason, losing temper easily.  Sleeping has improved with use of new BiPap mask, sleeps through the night.  \"I used to never get mad, but any little thing will set me off.\"  Symptoms are more often at work though occurs at home as well.  After situation patient will apologize for behavior.     \"Getting on someone at work to the point they are ready to cry.\" Simple mistakes he usually blows things off and is easy going.   Patient started testosterone injections 11/12/24 and receives a shot every 2 weeks at PCP office. Due to return end of this week, has had 2 injections. Reports very low testosterone levels. Patient has been more depressed, denies SI.     Denies use of Hydroxyzine will take if anticipates a stressful day at work, has 3-4 pills remaining. The last few times has only taken 25 mg. Denies panic attacks.     Depression: Patient complains of depression. He complains of anhedonia, depressed mood, difficulty concentrating, fatigue, and hopelessness  Anxiety:  The patient endorses to the following symptoms of anxiety including: excessive anxiety and worry about a number of events or activities for more days than not, restlessness or feeling keyed up, being easily fatigued, difficulty concentrating or mind going blank, and irritability which have caused impairment in important areas of daily functioning.  The patient has had symptoms of anxiety for several years, which have worsened over the last month.  " "    9/9/24:    Answers submitted by the patient for this visit:  Primary Reason for Visit (Submitted on 9/2/2024)  What is the primary reason for your visit?: Depression  Depression (Submitted on 9/2/2024)  Chief Complaint: Depression  Visit: follow-up  Frequency: occasionally  Severity: moderate  excessive worry: No  insomnia: No  irritability: Yes  malaise/fatigue: Yes  obsessions: No  hypersomnia: Yes  difficulty controlling mood: Yes  Medication compliance: %  Side effects: confusion, fatigue  Aggravated by: family issues, work stress    Patient presents today in office reports having \"back issues\" due to pain, noted elevated heart rate and blood pressure.     Patient has not used the Hydroxyzine for approximately 3 weeks, reports taking 2 in the morning and 1 in the afternoon and after 2 weeks ws only taking 1 in the morning and afternoon, then the following week did not have to take. The week patient had called was the 1 yr anniversary of father's stroke.  Patient had 2 uncles passed away within the last year.  Father had his stroke on 6/17/24.  \"I am feeling great now.\"    Patient has also changed positions at employer which has alleviated stress, which has also helped with anxiety level.   Patient plans to utilize lidocaine patches when he can't sleep due to severity of pain to back.     5/6/24: Patient presents today in office reports thinking he was going to be late as blood pressure 160/88 heart rate 97. Reports having 1 bad week, which patient contributes to wife being in a bad mood, as patient has learned he is influenced by her negative mood.  Was able to get through week, though had higher anxiety.   Patient has not made it to PCP appointment due to patient having the Flu in Jan. COVID 2/2024, sinus infection in 3/2024, and 4/2024 had the flu again, and has been so busy at work.  Patient feels difficulty with word finding is rare, maybe once a month, and feels symptoms may have been related to " "post COVID.     Continues to do well with Cymbalta dose.  Recheck blood pressure 148/88.   Depression: denies symptoms  Anxiety:  The patient endorses significant symptoms of anxiety including: excessive anxiety and worry about a number of events or activities for more days than not, being easily fatigued, difficulty concentrating or mind going blank, and irritability which have caused impairment in important areas of daily functioning.  Denies panic attacks.   Patient did feel somewhat anxious one day, and was able to concentrate on one thing and felt better after a few minutes. Patient has been able to identify triggers which has helped.     Patient 20th wedding anniversary is 8/7/24 and plan to go on a cruise though not scheduled yet.  Kids are going to be at different schools this year and is uncertain of their schedules.     3/4/24:   Answers submitted by the patient for this visit:  Primary Reason for Visit (Submitted on 3/3/2024)  What is the primary reason for your visit?: Other  Other (Submitted on 3/3/2024)  Please describe your symptoms.: Depression  Have you had these symptoms before?: Yes  How long have you been having these symptoms?: Greater than 2 weeks    Patient presents today in office.  No longer taking Hydroxyzine as patient has not had any panic attacks. Patient reports in Jan. 2024 had 2 days of feeling anxious though easily subsided.  Patient noticed while in conversation with other co-workers will forget words or names, even if family members, cannot recall name but can visualize their face, it used to happen minimally though has noticed more often the last few months.  Used to occur once a month in the past, now happening 5-6 times per day.  \"My mind will go blank for that one word.\"   Patient reports sleeping well with BiPap, able to wake up before alarm for the last 4 months, and is ready to get up and proceed with day. Patient denies other related memory problems.    Patient feels work " "and family load has lightened up, has a third of the stress he used to have in the summer time.   Patient had flu A, flu B, and Covid this year which were treated in [facility], believes was seen 24 and in 2024.   Patient recalls having difficulty in Dec before transmission of COVID, has had Covid (2023)a few times though minimal symptoms, coughs and chills. Patient is due for annual PCP visit and plans to get labs and see provider in the next few weeks.   Years ago would have difficulty if had to speak in front of large crowd, though otherwise has not been occurring until more frequently the last few months.     Depression and anxiety are fairly well controlled, denies new stressors, anxiousness, life changes.  Denies other memory difficulties.     10/30/23:    Answers submitted by the patient for this visit:  Primary Reason for Visit (Submitted on 10/23/2023)  What is the primary reason for your visit?: Other  Other (Submitted on 10/23/2023)  Please describe your symptoms.: Depression  Have you had these symptoms before?: Yes  How long have you been having these symptoms?: Greater than 2 weeks    Patient presents today in office \"everything is working very well. The last month has had more stressors.  As father got COVID, was in the hospital, suffered pneumonia, and heart attack, and was able to go home and passed away at home after all children and grandchildren visited, and passed away after being home 3 days.  Family thought he was doing well and didn't have hosparus after discharge.  Patient father had a stroke before in July and had minimal movement to right side, though when had COVID father declined further.  Overall patient feels he is coping well.      Reports continues to take Hydroxyzine 25 mg once daily though during  did take one before proceedings which helped. Patient feels Cymbalta has been effective, blood pressure today lower than past, overall feels anxiety and depression are well " "controlled.  Denies further panic attacks.    Depression: Patient complains of depression. He complains of depressed mood, difficulty concentrating, fatigue, feelings of worthlessness/guilt, insomnia, and weight gain  Anxiety:  The patient endorses significant symptoms of anxiety including: restlessness or feeling keyed up, being easily fatigued, difficulty concentrating or mind going blank, irritability, and sleep disturbance which have caused impairment in important areas of daily functioning.     7/31/23:   Answers submitted by the patient for this visit:  Primary Reason for Visit (Submitted on 7/10/2023)  What is the primary reason for your visit?: Other  Other (Submitted on 7/10/2023)  Please describe your symptoms.: Depression  Have you had these symptoms before?: Yes  How long have you been having these symptoms?: Greater than 2 weeks    Patient presents today in office with a blood pressure 164/104 today, reports wife has been checking at home at least 2 times per week 130's/80's. 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  Denies headaches, chest pain, soa, dizziness, nor sweating.     Instructed patient to continue 90 mg of Cymbalta in the morning and add 30 mg at night or he may take in the evening on 6/8/23 at last appointment, which patient reports tolerating well, noted effectiveness.    Patient went on vacation last week with family and received positive feedback, \"I was more involved in conversation. I think there's been a big improvement, I haven't had any sadness.\" Patient also attended a graduation and was more engaged.     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 Mother is now back home. Family has alternated staying " "with mother.  Health concerns with father has been stressful.     Patient continues to take daily morning Hydroxyzine, has not required other use, keeps supply in truck, though has not needed. Denies panic attacks.     Recheck blood pressure manually to right arm 152/84, hr 94    5/30/23:  Patient presents today in office, \"The first 3 weeks coming off that medicine I had angry spells, up and down, but the last 2-3 days I have been crying for no reason.\" Patient was taken off Zoloft 50 mg at last visit due to also taking Cymbalta 90 mg.     At last visit Hydroxyzine (Vistaril) was changed from 25 mg by mouth daily TO Hydroxyzine (Atarax) 25 mg by mouth 3 times daily as needed to target anxiety, panic attacks due to increased risks of sedation with Vistaril.  Patient was also instructed patient to start keeping a small supply of Hydroxyzine in truck instead of Klonopin, which patient reported rare use.  Advised to no longer take Klonopin if Hydroxyzine effective along with coping strategies discussed today.   Patient has not required use as as needed, and takes daily in the morning,and keeps extra in vehicle which patient has not had to take.  Denies drowsiness.     Panic attacks: denies  Patient weight noted with a decrease of 7 lbs, however, patient reports weight fluctuates often, and believes prior weight may have been up due to work clothes and pockets with items.     Patient no longer having anger spells, currently feelings of emptiness. Denies external stressors or changes.  This past Saturday went out with family, and Sunday night went to a concert, which patient found guillermo.  \"It comes out of nowhere, it hits me all at once.\"  Patient will try to distract self rather than dwell, though sometimes unable to distract.  Reports more sadness, hopeless feeling, and starts to cry, rather than anxiousness.        4/18/23:  INITIAL EVAL  Patient presents today in office with a history of anxiety and depression for " "which treatment began approximately 20 yrs ago.  Patient is currently prescribed Cymbalta 90 mg, Zoloft 50 mg, and Hydroxyzine (Vistaril) 25 mg daily, which have provided effectiveness.  Patient has a PMHX of Hypertension, Migraines, ANGELICA with use of CPAP, hypogonadism, ED, prostate disorder, Arthritis, gout, lumbar back surgery, peripheral neuropathy (BLE) and allergic rhinitis.    Patient goal of treatment \"just to feel somewhat normal, and have some emotion, right now I feel I am just here.\"    Patient was with an [facility] provider for approximately 1.5 yrs, last seen via video approximately 1 month ago, was not happy with care due to frequent medication changes.  As patient reports taking 4 different medications at the same time, and is unable to determine their effectiveness.     Patient reports having thoughts of a truck hitting him while driving, feels worthless at times.  Denies suicidal planning, thoughts of action plan, or rumination.  \"That family would be better off dead, negative thoughts start to spiral through, and over thinks meaning how much better would my family be if this truck lost control.\"  Once patient is around his 3 kids or is busy at work he is able to alleviate negative thoughts.  These thoughts have occurred once every several months.      Wife tells patient he is emotionless, which has been going on for approximately 6-8 months.  Patient recalls while family was bowling, family was having fun, showing emotion, though patient had no expression or emotion to surroundings.      Patient is certain he is taking Cymbalta 90 mg in the morning, Hydroxyzine 25 mg daily in the morning is allowed to take up to 3 times daily,  Zoloft 50 mg in the morning.  The Klonopin 0.5 mg was filled 22 for 15 tabs, and last took yesterday due to elevated stress and prior was at least 2-3 months.  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      In regards to anxiety, \"it is out of nowhere\", will feel discomfort to chest, anxiousness, feeling soa, which is typically felt after arrival to work which is approximately 10 minutes after arising. Though has felt symptoms in the past without being work related.  Patient unable to identify specific triggers of anxiety.  Patient will try to calm self by listening to music alone in truck, distractions which seems to help if anxiety is not as bad.   And if not relieved will only take half of the Klonopin 0.5 mg which is effective, as patient try's to not take unless absolutely necessary. Patient believes he has 8-9 tabs of Klonopin remaining.  Patient denies use of as needed Hydroxyzine in place of Klonopin in the past.     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 Patient does suffer from arthritis. At times patient does experience blurred vision, which is intermittent. Denies use of prescription lenses or contacts or recent eye exam. Left hand does shake with fine motor skills such as writing, which patient recently noticed over the last few months.     Cymbalta for approximately 3 yrs, had been effective, however, per pharmacy record patient provided today, dose was increased from 60 mg to 90 mg 8/30/22.  And Zoloft 50 mg started 9/19/22.  Nerve pain to ble has been managed well with Cymbalta.  Patient occasionally takes another antihistamine a few weeks ago due to allergy season, otherwise does not take routinely or past start of spring.  Denies difficulty with sleep since starting using CPAP.  Denies day time drowsiness.  Confirmed patient is no longer taking Prozac, Rexulti, Buspar, or Effexor XR.      Depression: Patient complains of depression. He complains of anhedonia, depressed mood, difficulty concentrating, feelings of " worthlessness/guilt and appetite changes.         Anxiety: The patient endorses significant symptoms of anxiety including:  anxioiusness, feeling on edge, , restlessness or feeling keyed up, being easily fatigued, difficulty concentrating or mind going blank, irritability, muscle tension and sleep disturbance which have caused impairment in important areas of daily functioning.  The patient has had symptoms of anxiety for over 20 yrs, which have worsened over the last 8-9 months.       PHQ-9 Depression Screening  PHQ-9 Total Score:   12/9/2024 10   Little interest or pleasure in doing things? Several days   Feeling down, depressed, or hopeless? Over half   PHQ-2 Total Score 3   Trouble falling or staying asleep, or sleeping too much? Over half   Feeling tired or having little energy? Several days   Poor appetite or overeating? Not at all   Feeling bad about yourself - or that you are a failure or have let yourself or your family down? Over half   Trouble concentrating on things, such as reading the newspaper or watching television? Over half   Moving or speaking so slowly that other people could have noticed? Or the opposite - being so fidgety or restless that you have been moving around a lot more than usual? Not at all   Thoughts that you would be better off dead, or of hurting yourself in some way? Not at all   PHQ-9 Total Score 10   If you checked off any problems, how difficult have these problems made it for you to do your work, take care of things at home, or get along with other people? Somewhat difficult          BERNICE-7  Feeling nervous, anxious or on edge: More than half the days  Not being able to stop or control worrying: More than half the days  Worrying too much about different things: More than half the days  Trouble Relaxing: More than half the days  Being so restless that it is hard to sit still: Several days  Feeling afraid as if something awful might happen: Not at all  Becoming easily annoyed or  irritable: More than half the days  BERNICE 7 Total Score: 11  If you checked any problems, how difficult have these problems made it for you to do your work, take care of things at home, or get along with other people: Somewhat difficult 12/9/2024     The following portions of the patient's history were reviewed and updated as appropriate: allergies, current medications, past family history, past medical history, past social history, and past surgical history.     Past Surgical History:  Past Surgical History:   Procedure Laterality Date    BACK SURGERY      x 2       Problem List:  Patient Active Problem List   Diagnosis    Anxiety    Depressive disorder    History of colonic polyps    Hyperglycemia    Hyperlipidemia    Low back pain    Pain in soft tissues of limb    Thoracic spondylosis with myelopathy       Allergy:   No Known Allergies     Discontinued Medications:  Medications Discontinued During This Encounter   Medication Reason    hydrOXYzine (ATARAX) 25 MG tablet Reorder           Current Medications:   Current Outpatient Medications   Medication Sig Dispense Refill    atorvastatin (LIPITOR) 20 MG tablet Take 1 tablet by mouth Daily.      DULoxetine (CYMBALTA) 30 MG capsule TAKE THREE CAPSULES BY MOUTH EVERY MORNING AND TAKE 1 CAPSULE BY MOUTH EVERY NIGHT 360 capsule 2    hydrOXYzine (ATARAX) 25 MG tablet Take 1 tablet by mouth 3 (Three) Times a Day As Needed for Anxiety (panic attacks). DO NOT take any other Antihistamine or Allergy medication within 4 hrs of taking Hydroxyzine. MAY INCREASE TO 50 MG IF 37.5 MG NOT EFFECTIVE & WITHOUT DROWSINESS  Indications: Feeling Anxious 180 tablet 0    lisinopril (PRINIVIL,ZESTRIL) 10 MG tablet Take 1 tablet by mouth Daily.      tamsulosin (FLOMAX) 0.4 MG capsule 24 hr capsule Take 1 capsule by mouth Daily.      Testosterone Cypionate (DEPOTESTOTERONE CYPIONATE) 200 MG/ML injection Inject 1 mL into the appropriate muscle as directed by prescriber.      aspirin 81 MG EC  "tablet Take 1 tablet by mouth. (Patient not taking: Reported on 12/9/2024)      cyclobenzaprine (FLEXERIL) 10 MG tablet One po qhs for muscle spasm. (Patient not taking: Reported on 12/9/2024)       No current facility-administered medications for this visit.       Past Medical History:  Past Medical History:   Diagnosis Date    Anxiety     Chronic pain disorder     Depression     Obsessive-compulsive disorder     Panic disorder     Peripheral neuropathy        Past Psychiatric History:  Began Treatment: 20 yrs ago prior to getting , though dealt with symptoms since childhood  Diagnoses:Depression, Anxiety and OCD, panic disorder  Psychiatrist: DELMY Alamo with [facility] 1.5 yrs, last seen 3/2022  Therapist: 2- one had about 6 visits approx 15 yrs ago; and 8 yrs ago seen a lady in [place]  Admission History:Denies  Medication Trials: several changes while at [facility], \"certain one's i took in the morning and i couldn't get out of truck, like i was froze, worrying\"added several meds to Cymbalta; Effexor XR, Buspar was unable to keep up with overlapping med orders; Rexulti, Prozac, and Zoloft (9/2022-4/18/23 stopped due to risk of serotonin syndrome wihile taking with Cymbalta)  Hydroxyzine-effective  Self Harm: Denies  Suicide Attempts:Denies      Substance Abuse History:   Types:Denies all, including illicit  Withdrawal Symptoms:Denies  Longest Period Sober:Not Applicable   AA: Not applicable     Social History:  Martial Status:  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, where [employer]   Kids:Yes or If so, how many 3- age 13,10, and 7 as of 4/2023  House:Lives in a house   History: Denies  Access to Guns:  Yes, locked in safe    Social History     Socioeconomic History    Marital status:     Number of children: 3   Tobacco Use    Smoking status: Never    Smokeless tobacco: Current     Types: Snuff   Vaping Use    Vaping status: Never Used   Substance and Sexual Activity    Drug use: " Never     Comment: only anything ever prescribed (but did one time fail drug test due to CBD drops not using currently)    Sexual activity: Yes       Family History:   Suicide Attempts: Denies  Suicide Completions:Denies      Family History   Problem Relation Age of Onset    Depression Mother     Anxiety disorder Mother     Alcohol abuse Father     Alcohol abuse Sister     Alcohol abuse Brother        Developmental History:   Born: [place]  Siblings:3 sisters, 1 brother  Childhood: Denies Abuse  High School:Completed  College: [employer] degree    Mental Status Exam:   Hygiene:   good  Cooperation:  Cooperative  Eye Contact:  Good  Psychomotor Behavior:  Appropriate  Affect:  Appropriate  Mood: anxious    Speech:  Normal  Thought Process:  Goal directed  Thought Content:  Mood congruent  Suicidal:  None  Homicidal:  None  Hallucinations:  None  Delusion:  None  Memory:  Intact  Orientation:  Grossly intact  Reliability:  good  Insight:  Good  Judgement:  Good  Impulse Control:  Good  Physical/Medical Issues:  Yes Hypertension, Migraines, ANGELICA with CPAP, peripheral neuropathy, ED, Hypogonadism, prostate disorder, Arthritis, gout, lumbar back surgery, and allergic rhinitis.      Review of Systems:  Review of Systems   Constitutional:  Negative for fatigue.   HENT:  Negative for trouble swallowing.    Respiratory:  Positive for apnea. Negative for cough and shortness of breath.         BiPap   Cardiovascular:  Negative for chest pain and palpitations.   Gastrointestinal:  Negative for diarrhea and nausea.   Musculoskeletal:  Positive for arthralgias and back pain.   Neurological:  Positive for numbness. Negative for dizziness and seizures.        Ble peripheral neuropathy   Psychiatric/Behavioral:  Positive for agitation. Negative for confusion, decreased concentration, hallucinations, self-injury, sleep disturbance and suicidal ideas. The patient is nervous/anxious. The patient is not hyperactive.   "        Physical Exam:  Physical Exam  Psychiatric:         Attention and Perception: Attention and perception normal.         Mood and Affect: Mood and affect normal. Mood is anxious.         Speech: Speech normal.         Behavior: Behavior normal. Behavior is cooperative.         Thought Content: Thought content normal. Thought content does not include suicidal ideation. Thought content does not include suicidal plan.         Cognition and Memory: Cognition and memory normal.         Judgment: Judgment normal.         Vital Signs:   /88   Pulse 113   Ht 190.5 cm (73\")   Wt (!) 137 kg (302 lb)   BMI 37.75 kg/m²      Office notes from care team reviewed:  Date of Service: 11/12/24 OV with JERRICA Padilla     Lab Results: REVIEWED  Lab Results   Component Value Date   WBC 8.2 11/08/2024   RBC 4.89 11/08/2024   HEMOGLOBIN 14.5 11/08/2024   HEMATOCRIT 42.4 11/08/2024   PLATELETS 308 11/08/2024   MCV 87 11/08/2024   MCH 29.7 11/08/2024     Lab Results   Component Value Date   GLUCOSESERU 98 11/08/2024   BUN 14 11/08/2024   CREATININES 1.00 11/08/2024   BUNCREATINI 14 11/08/2024   EGFR 94 11/08/2024   SODIUMSERUM 140 11/08/2024   POTASSIUMSE 4.4 11/08/2024   CALCIUMSERU 10.1 11/08/2024   CHLORIDESER 101 11/08/2024   CARBONDIOXID 25 11/08/2024   BILIRUBINTO 0.5 11/08/2024   ALKALINEPHOS 100 11/08/2024   ASTSGOT 27 11/08/2024   ALTSGPT 25 11/08/2024     Lab Results   Component Value Date   CHOL 196 11/08/2024   TRIG 182 (H) 11/08/2024   HDL 35 (L) 11/08/2024   LDLCALC 128 (H) 11/08/2024   LABVLDL 33 11/08/2024     Lab Results   Component Value Date   TSH 0.468 11/08/2024     Lab Results   Component Value Date   PROSTATESPEC 0.4 11/08/2024     No visits with results within 6 Month(s) from this visit.   Latest known visit with results is:   No results found for any previous visit.       EKG Results:  No orders to display       Imaging Results:  No Images in the past 120 days found..      Assessment & Plan   Diagnoses " and all orders for this visit:    1. Generalized anxiety disorder with panic attacks (Primary)  -     hydrOXYzine (ATARAX) 25 MG tablet; Take 1 tablet by mouth 3 (Three) Times a Day As Needed for Anxiety (panic attacks). DO NOT take any other Antihistamine or Allergy medication within 4 hrs of taking Hydroxyzine. MAY INCREASE TO 50 MG IF 37.5 MG NOT EFFECTIVE & WITHOUT DROWSINESS  Indications: Feeling Anxious  Dispense: 180 tablet; Refill: 0    2. Mood changes    3. MDD (major depressive disorder), recurrent episode, mild    4. Adverse effect of testosterone, initial encounter    5. Medication management    6. Medication care plan discussed with patient          Visit Diagnoses:    ICD-10-CM ICD-9-CM   1. Generalized anxiety disorder with panic attacks  F41.1 300.02    F41.0 300.01   2. Mood changes  R45.86 296.90   3. MDD (major depressive disorder), recurrent episode, mild  F33.0 296.31   4. Adverse effect of testosterone, initial encounter  T38.7X5A E932.1   5. Medication management  Z79.899 V58.69   6. Medication care plan discussed with patient  Z71.89 V65.49                   PLAN:  Safety: No acute safety concerns  Therapy: Declines   Risk Assessment: Risk of self-harm acutely is moderate.  Risk factors include anxiety disorder, mood disorder, access to guns/weapons.  Protective factors include no family history, no present SI, no history of suicide attempts or self-harm in the past, minimal AODA, healthcare seeking, future orientation, willingness to engage in care.  Risk of self-harm chronically is also moderate, but could be further elevated in the event of treatment noncompliance and/or AODA.  Meds:  -Continue Cymbalta (Duloxetine) 90 mg by mouth daily in the morning AND 30 mg nightly to target depression and anxiety as well as peripheral neuropathy.    -Continue Hydroxyzine 25 mg by mouth 3 times daily as needed to target anxiety. Refilled today for 90 days due to minimal use reported, and decreased to  only 25 mg.     5. Labs: n/a    Symptoms of anxiety, depression are under good control with current medication regimen. Suspect patient is experiencing an adverse drug reaction to recent start of Testosterone almost 4 weeks ago, advised patient to inform PCP of mood changes before next injection is due later this week, as time line of symptoms coincide with start of Testosterone and discussed potential mood changes today in regards to this medication.     The plan was discussed with the patient. The patient was given time to ask questions and these questions were answered. At the conclusion of their visit they had no additional questions or concerns and all questions were answered to their satisfaction.   Patient was given instructions and counseling regarding condition and for health maintenance advice. Please see specific information pulled into the AVS if appropriate.    Patient to contact provider if symptoms worsen or fail to improve.      9/9/24:   -Continue Cymbalta (Duloxetine) 90 mg by mouth daily in the morning AND 30 mg nightly to target depression and anxiety as well as peripheral neuropathy.    -Continue Hydroxyzine 25-50 mg by mouth 3 times daily as needed to target anxiety. Has not required in the last 3 weeks. No refills needed at this time.     Symptoms of anxiety, depression are under good control with current medication regimen.  Elevated blood pressure and heart rate related to chronic back pain. Overall mood is stable.   The plan was discussed with the patient. The patient was given time to ask questions and these questions were answered. At the conclusion of their visit they had no additional questions or concerns and all questions were answered to their satisfaction.   Patient was given instructions and counseling regarding condition and for health maintenance advice. Please see specific information pulled into the AVS if appropriate.    Patient to contact provider if symptoms worsen or fail  "to improve.        6/21/24:  Telephone  -Patient called to advise that the medication you gave him around June 11,2024 seem to be working but here the past couple of days it is Not Working.  Patient advises he left work yesterday and then today when he was getting ready to go to work he was \"just BLAH\" and could not go.  Patient is requesting a callback.      -Returned call to patient regarding Hydroxyzine no longer working, patient reports started to feel better last week did not have any anxiety, and yesterday while driving to work, \"felt very anxious, almost like a fear, I don't know why, I froze\" and turned around and came back home, and this morning while getting ready to go to work the same feeling came over him, and did not go to work today.  Reports taking Hydroxyzine 25 mg in the morning, at lunch, and at 7 pm, and did not feel feel drowsy.      Instructed patient to start taking 37.5 mg (1 and a half tablets of the 25 mg) by mouth 3 times daily as needed for anxiety, panic attacks, and if ineffective and not causing drowsiness, may increase further to 50 mg (2 tabs) 3 times daily as needed.  Patient instructed to contact provider if symptoms worsen or fail to improve, and if he is unable to go to work on Monday to contact provider so he can be scheduled for an appointment. Will provide a work note for today. Patient verbalized understanding and agreeable with plan.     6/11/24:  TELEPHONE  Returned patient's call to further inquire. Patient advises \"I about had a mental breakdown last night,panic attack and crying almost to the point where I thought I was going crazy!\" I asked patient if he felt suicidal or homicidal and advised him if that ever becomes the case to go to the Emergency Room to which patient replied \"Yes.\"  I asked patient if he knew what might have brought it on?  Patient said \"I was off work yesterday due to my back going out. And just the thought of going back to work and the stressors " "there I just lost it.\"  I told patient I would have Luz Elena call him or work him in somewhere today.  Patient verbalized understanding.    -Returned call to patient and patient reports having a mental breakdown last night, \"I felt like I was going crazy, heart was racing, chest was hurting.\" Yesterday patient was off work due to back problem, and was going to return today though upon the thought of returning to work last night began to experience symptoms.  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 Patient feels the anxiety was sudden.   Denies SI/HI, though feels worthless, \"like I don't have any value.\" Patient is agreeable to restart Hydroxyzine as it was effective in the past.  Patient instructed to contact provider no later than this Friday if symptoms worsen or fail to improve. Patient verbalized understanding.     Start Hydroxyzine 25 mg by mouth 3 times daily as needed to target anxiety, panic attacks.  Due to potential drowsiness, instructed patient to not drive, use machinery, or do anything that needs mental alertness until you know how this medication affects you.    Instructed to not take any other Antihistamine/Allergy medication such as but not limited to:  Zyrtec, Claritin, Allegra, or Benadryl within 4 hrs of taking Hydroxyzine.  Risks, benefits, alternatives discussed with patient including sedation, dizziness, fall risk, GI upset, and risk of increased CNS depression and elevated heart rate if taken with other antihistamines.  After discussion of these risks and benefits, the patient voiced understanding and agreed to proceed.  Patient did tolerate well in the past without reported drowsiness. New prescription sent to [facility].     5/6/24:   -Continue Cymbalta (Duloxetine) 90 mg by mouth daily in the morning AND 30 mg nightly to target depression and anxiety as well as peripheral neuropathy.   Patient instructed to request refills when appropriate, " when down to 5-7 days remaining via  pharmacy or via [facility].       Symptoms of anxiety, depression are under good control with current medication regimen.    Patient was given instructions and counseling regarding condition and for health maintenance advice. Please see specific information pulled into the AVS if appropriate.    Patient to contact provider if symptoms worsen or fail to improve.        3/4/24:   -Continue Cymbalta (Duloxetine) 90 mg by mouth daily in the morning AND 30 mg nightly to target depression and anxiety as well as peripheral neuropathy.   -Removed Hydroxyzine (Atarax) as patient no longer taking.     Symptoms of anxiety, depression are under good control with current medication regimen.  New symptoms of difficulty with word finding while in conversations in random settings which has worsened over time, etiology is uncertain.  Patient compliant with BiPap, sleeping well, has been on current dose of Cymbalta since 6/2023, denies anxiousness, recent bouts of flu and Covid are potential reasons, however, duration of difficulty with word finding began beforehand.  Unable to locate specific dates of viral illness, advised patient to have routine labs with upcoming appointment with PCP for annual well visit.  And patient is to discuss further with PCP, and to contact provider if any unknown findings are found.  Patient will need annual labs including but not limited to , CBC, CMP, Thyroid panel with TSH, Hepatic Profile, Lipid profile, Iron Profile, Vitamin D-25 hydroxy, Vitamin B-12, Folate, and others the PCP may advise.   Patient was given instructions and counseling regarding condition and for health maintenance advice. Please see specific information pulled into the AVS if appropriate.    Patient to contact provider if symptoms worsen or fail to improve.      10/30/23:  -Continue Cymbalta (Duloxetine) 90 mg by mouth daily in the morning AND 30 mg nightly to target depression and anxiety as  well as peripheral neuropathy. Refilled today 90 days with 2 refills.   -Continue Hydroxyzine (Atarax) 25 mg by mouth 3 times daily as needed to target anxiety, panic attacks.     Symptoms of anxiety, depression are under good control with current medication regimen.    Patient was given instructions and counseling regarding condition and for health maintenance advice. Please see specific information pulled into the AVS if appropriate.    Patient to contact provider if symptoms worsen or fail to improve.        7/31/23:   Therapy: Declines due to inconsistent work schedule  -Continue Cymbalta (Duloxetine) 90 mg by mouth daily in the morning AND 30 mg nightly to target depression and anxiety as well as peripheral neuropathy.  NR needed today  -Continue Hydroxyzine (Atarax) 25 mg by mouth 3 times daily as needed to target anxiety, panic attacks. Instructed to not take any other Antihistamine/Allergy medication such as but not limited to:  Zyrtec, Claritin, Allegra, or Benadryl within 4 hrs of taking Hydroxyzine.  Denies drowsiness with once daily dose every morning, has not required more than once daily.  -Patient instructed to request refill when appropriate from pharmacy or via [facility]. Cymbalta prescription will end 9/7/23, may request up to 7 days prior.     Symptoms of anxiety with panic attacks and depression are under good control with current medication regimen.  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   Patient was given instructions and counseling regarding condition and for health maintenance advice. Please see specific information pulled into the AVS if appropriate.    Patient to contact provider if symptoms worsen or fail to improve.      6/8/23: TELEPHONE  Patient  "called back today.  Patient said the entire day yesterday he was crying and sleeping.  Patient reports today he is not crying but still very depressed \"feels like I have a pit in my stomach and I feel very anxious.\" I explained to patient that if he is having SI to immediately to go to the ER so he may be evaluated\" Patient voiced understanding.  I asked patient if he would like me to send the message to a Provider who is in the [place] office patient stated No just send a message to Luz Elena and have her call me later.\" Patient denies plans to act on the SI.   PLEASE CALL.    Returned call to patient as requested patient reports this past Wed. Had a horrible day, and the last 2 days has been feeling better, no longer crying, still has knot in stomach, was worried due to having SI Tues night until Wed. \"It just crossed my mind, I wasn't acting on it or anything.\" Explains waking up Wed. Not feeling right, had to drive to [place] for a job and cried the entire way and ended up not being able to work and went to mom's and dad's and slept the rest of the day.  In regards to blood pressure, patient reports wife has checked and readings have been 124/84. And checks every 2 days. Patient is agreeable to add Cymbalta 30 mg at night and have wife continue to check blood pressure, patient reports having extra Cymbalta 30 mg on hand.  Instructed patient to continue 90 mg of Cymbalta in the morning and add 30 mg at night or he may take in the evening, patient verbalized understanding, updated order to reflect accuracy in EHR as a NO PRINT.  Patient to contact provider if symptoms worsen or fail to improve.  And if blood pressure readings elevate.     6/7/23: TELEPHONE  Patient [facility] that his depression has \"really increased and I am beginning to have some SI.  Please advise  Tried returning call to patient but phone continually rang no answer and no voicemail option.    5/30/23:   -Continue Cymbalta (Duloxetine) 90 mg by " mouth daily in the morning to target depression and anxiety as well as peripheral neuropathy.    -Continue Hydroxyzine (Atarax) 25 mg by mouth 3 times daily as needed to target anxiety, panic attacks. Denies drowsiness with once daily dose every morning, has not required more than once daily.  Patient instructed to Start tracking symptoms with date, time, symptoms experiencing, potential cause-what were you doing at the time of the sudden symptoms you experienced if you can recall.  How were you able to stop symptoms? And have wife check blood pressure at least once weekly.     Symptoms of anxiety, panic attacks, and depression are under fair control with current medication regimen.  Due to duration of current symptoms expressed today and elevated blood pressure, will have patient start tracking symptoms, have wife check blood pressure at least once weekly and report with next visit.  Patient advised to contact provider if symptoms are occurring more often than not, as we discussed adding Cymbalta 30 mg nightly, though would like to see blood pressure more stable and symptom tracking before proceeding, as patient was informed if blood pressure did continue to elevate would have to stop the 30 mg dose.   Patient to contact provider if symptoms worsen or fail to improve.       4/18/23:   Safety: No acute safety concerns  Therapy: Declines due to inconsistent work schedule  Continue Cymbalta (Duloxetine) 90 mg by mouth daily in the morning to target depression and anxiety as well as peripheral neuropathy.   Discussed all risks, benefits, alternatives, and side effects of Duloxetine (Cymbalta) including but not limited to GI upset, sexual dysfunction, bleeding risk, seizure risk, weight loss, insomnia, diaphoresis, drowsiness, headache, dizziness, fatigue, activation of kimmie or hypomania, increased fragility fracture risk, hyponatremia, increased BP, hepatotoxicity, ocular effects, withdrawal syndrome following abrupt  discontinuation, serotonin syndrome, and activation of suicidal ideation and behavior.   Discussed the need for patient to immediately call the office for any new or worsening symptoms, such as worsening depression; feeling nervous or restless; suicidal thoughts or actions; or other changes in mood or behavior, and all other concerns. Patient educated on medication compliance and the risks of suddenly stopping this medication or missing doses. Patient verbalized understanding and is agreeable to taking Duloxetine. Addressed all questions and concerns.    Change Hydroxyzine (Vistaril) 25 mg by mouth daily TO Hydroxyzine (Atarax) 25 mg by mouth 3 times daily as needed to target anxiety, panic attacks due to increased risks of sedation with Vistaril.  Instructed to not take any other Antihistamine/Allergy medication such as but not limited to:  Zyrtec, Claritin, Allegra, or Benadryl within 4 hrs of taking Hydroxyzine.  Risks, benefits, alternatives discussed with patient including sedation, dizziness, fall risk, GI upset, and risk of increased CNS depression and elevated heart rate if taken with other antihistamines.  After discussion of these risks and benefits, the patient voiced understanding and agreed to proceed. Instructed patient to start keeping a small supply of Hydroxyzine in truck instead of Klonopin, which patient reported rare use.  Advised to no longer take Klonopin if Hydroxyzine effective along with coping strategies discussed today.     Stop Zoloft due to risks of serotonin syndrome in combination with Cymbalta, informed patient he may experience increased irritability and GI discomfort which should subside in a few days. If symptoms fail to improve contact provider.     Removed Prozac from medication profile as patient has not taken for some time.     Patient instructed to bring AVS with current medication list to pharmacy upon picking up Cymbalta and Hydroxyzine for accuracy. Highlighted area to show  pharmacy.   Coping mechanisms discussed with patient today as a way to gain control over feelings to prevent situation or panic attack from getting worse: grounding techniques using 5 senses (name 3 things you can see, smell, touch, etc.), slow paced breathing techniques- focus on door inhaling and exhaling at each corner, application of cold compress/ice pack/water on face or opening freezer door to allow cold air to be breathed in taking slow deep breaths, closing eyes and focus on positive thoughts.    Patient presentation seems most consistent with depression, anxiety, and panic attacks.  Reviewed medication dispense record from [facility] pharmacy 1/1/21-12/31/22, reconciled medications.   Patient to contact provider if symptoms worsen or fail to improve.      Patient screened positive for depression based on a PHQ-9 score of 10 on 12/9/2024. Follow-up recommendations include: Prescribed antidepressant medication treatment and Suicide Risk Assessment performed.       TREATMENT PLAN/GOALS: Continue supportive psychotherapy efforts and medications as indicated. Treatment and medication options discussed during today's visit. Patient acknowledged and verbally consented to continue with current treatment plan and was educated on the importance of compliance with treatment and follow-up appointments.    MEDICATION ISSUES:  HANSEL reviewed as expected.  Discussed medication options and treatment plan of prescribed medication as well as the risks, benefits, and side effects including potential falls, possible impaired driving and metabolic adversities among others. Patient is agreeable to call the office with any worsening of symptoms or onset of side effects. Patient is agreeable to call 911 or go to the nearest ER should he/she begin having SI/HI. No medication side effects or related complaints today.     MEDS ORDERED DURING VISIT:  New Medications Ordered This Visit   Medications    hydrOXYzine (ATARAX) 25 MG tablet      Sig: Take 1 tablet by mouth 3 (Three) Times a Day As Needed for Anxiety (panic attacks). DO NOT take any other Antihistamine or Allergy medication within 4 hrs of taking Hydroxyzine. MAY INCREASE TO 50 MG IF 37.5 MG NOT EFFECTIVE & WITHOUT DROWSINESS  Indications: Feeling Anxious     Dispense:  180 tablet     Refill:  0       Return in about 3 months (around 3/9/2025) for medication check.         I spent 34 minutes caring for Naldo on this date of service. This time includes time spent by me in the following activities: preparing for the visit, reviewing tests, obtaining and/or reviewing a separately obtained history, performing a medically appropriate examination and/or evaluation, counseling and educating the patient/family/caregiver, ordering medications, tests, or procedures, referring and communicating with other health care professionals, documenting information in the medical record, care coordination, and scheduling .      This document has been electronically signed by DELMY Rodriguez  December 9, 2024 16:43 EST      Part of this note may be an electronic transcription/translation of spoken language to printed text using the Dragon Dictation System.

## 2025-03-11 ENCOUNTER — OFFICE VISIT (OUTPATIENT)
Dept: BEHAVIORAL HEALTH | Facility: CLINIC | Age: 45
End: 2025-03-11
Payer: COMMERCIAL

## 2025-03-11 VITALS
BODY MASS INDEX: 39.12 KG/M2 | HEART RATE: 124 BPM | DIASTOLIC BLOOD PRESSURE: 80 MMHG | SYSTOLIC BLOOD PRESSURE: 132 MMHG | HEIGHT: 75 IN | WEIGHT: 314.6 LBS

## 2025-03-11 DIAGNOSIS — F41.0 GENERALIZED ANXIETY DISORDER WITH PANIC ATTACKS: Primary | ICD-10-CM

## 2025-03-11 DIAGNOSIS — Z79.899 MEDICATION MANAGEMENT: ICD-10-CM

## 2025-03-11 DIAGNOSIS — F33.41 RECURRENT MAJOR DEPRESSIVE DISORDER, IN PARTIAL REMISSION: ICD-10-CM

## 2025-03-11 DIAGNOSIS — F41.1 GENERALIZED ANXIETY DISORDER WITH PANIC ATTACKS: Primary | ICD-10-CM

## 2025-03-11 DIAGNOSIS — Z71.89 MEDICATION CARE PLAN DISCUSSED WITH PATIENT: ICD-10-CM

## 2025-03-11 RX ORDER — TESTOSTERONE CYPIONATE 200 MG/ML
200 INJECTION, SOLUTION INTRAMUSCULAR
COMMUNITY
Start: 2025-02-05

## 2025-03-11 RX ORDER — DULOXETIN HYDROCHLORIDE 30 MG/1
CAPSULE, DELAYED RELEASE ORAL
Qty: 360 CAPSULE | Refills: 3 | Status: SHIPPED | OUTPATIENT
Start: 2025-03-11

## 2025-03-11 NOTE — PROGRESS NOTES
"  Subjective   Naldo Womack is a 44 y.o. male who presents today for follow up    Referring Provider:  No referring provider defined for this encounter.    Chief Complaint:  anxiety with panic attacks, depression, medication management, medication care plan discussed    Answers submitted by the patient for this visit:  Depression (Submitted on 3/4/2025)  Chief Complaint: Depression  Visit: follow-up  Frequency: occasionally  Severity: moderate  excessive worry: Yes  insomnia: Yes  irritability: Yes  malaise/fatigue: Yes  obsessions: No  hypersomnia: No  difficulty controlling mood: Yes  difficulty staying asleep: Yes  difficulty falling asleep: No  Hours of sleep per night: 5 Hours  Medication compliance: %  Aggravated by: family issues, work stress    History of Present Illness:    3/11/25: Patient presents today in office with a heart rate of 124, which patient attributes to running behind to get here today. Denies chest pain or soa.   Patient has recently tried to keep an eye on weight, as patient has noticed weight gain since starting Testosterone. Patient was told by PCP the mood changes should resolve over time, and for the last 2 months has noticed those symptoms have subsided, no longer losing his temper. Patient weight is usually around 290-300 lbs. Patient did go on a week cruise in Feb with wife and felt they needed a break.     Patient took 1 of the Hydroxyzine prior to flight to be safe, and has not required further use. Since the first of the year patient reports feeling \"great\".  Denies symptoms of depression, anxiety, and no panic attacks.      Wt Readings from Last 3 Encounters:   03/11/25 (!) 143 kg (314 lb 9.6 oz)   12/09/24 (!) 137 kg (302 lb)   09/09/24 135 kg (296 lb 12.8 oz)       12/9/24:  Patient presents today in office reports anxiety has been stable, though for the last 2-4 weeks have had worsen depressive symptoms, crying for no reason, losing temper easily.  Sleeping has " "improved with use of new BiPap mask, sleeps through the night.  \"I used to never get mad, but any little thing will set me off.\"  Symptoms are more often at work though occurs at home as well.  After situation patient will apologize for behavior.     \"Getting on someone at work to the point they are ready to cry.\" Simple mistakes he usually blows things off and is easy going.   Patient started testosterone injections 11/12/24 and receives a shot every 2 weeks at PCP office. Due to return end of this week, has had 2 injections. Reports very low testosterone levels. Patient has been more depressed, denies SI.     Denies use of Hydroxyzine will take if anticipates a stressful day at work, has 3-4 pills remaining. The last few times has only taken 25 mg. Denies panic attacks.     Depression: Patient complains of depression. He complains of anhedonia, depressed mood, difficulty concentrating, fatigue, and hopelessness  Anxiety:  The patient endorses to the following symptoms of anxiety including: excessive anxiety and worry about a number of events or activities for more days than not, restlessness or feeling keyed up, being easily fatigued, difficulty concentrating or mind going blank, and irritability which have caused impairment in important areas of daily functioning.  The patient has had symptoms of anxiety for several years, which have worsened over the last month.      9/9/24:    Answers submitted by the patient for this visit:  Primary Reason for Visit (Submitted on 9/2/2024)  What is the primary reason for your visit?: Depression  Depression (Submitted on 9/2/2024)  Chief Complaint: Depression  Visit: follow-up  Frequency: occasionally  Severity: moderate  excessive worry: No  insomnia: No  irritability: Yes  malaise/fatigue: Yes  obsessions: No  hypersomnia: Yes  difficulty controlling mood: Yes  Medication compliance: %  Side effects: confusion, fatigue  Aggravated by: family issues, work " "stress    Patient presents today in office reports having \"back issues\" due to pain, noted elevated heart rate and blood pressure.     Patient has not used the Hydroxyzine for approximately 3 weeks, reports taking 2 in the morning and 1 in the afternoon and after 2 weeks ws only taking 1 in the morning and afternoon, then the following week did not have to take. The week patient had called was the 1 yr anniversary of father's stroke.  Patient had 2 uncles passed away within the last year.  Father had his stroke on 6/17/24.  \"I am feeling great now.\"    Patient has also changed positions at employer which has alleviated stress, which has also helped with anxiety level.   Patient plans to utilize lidocaine patches when he can't sleep due to severity of pain to back.     5/6/24: Patient presents today in office reports thinking he was going to be late as blood pressure 160/88 heart rate 97. Reports having 1 bad week, which patient contributes to wife being in a bad mood, as patient has learned he is influenced by her negative mood.  Was able to get through week, though had higher anxiety.   Patient has not made it to PCP appointment due to patient having the Flu in Jan. COVID 2/2024, sinus infection in 3/2024, and 4/2024 had the flu again, and has been so busy at work.  Patient feels difficulty with word finding is rare, maybe once a month, and feels symptoms may have been related to post COVID.     Continues to do well with Cymbalta dose.  Recheck blood pressure 148/88.   Depression: denies symptoms  Anxiety:  The patient endorses significant symptoms of anxiety including: excessive anxiety and worry about a number of events or activities for more days than not, being easily fatigued, difficulty concentrating or mind going blank, and irritability which have caused impairment in important areas of daily functioning.  Denies panic attacks.   Patient did feel somewhat anxious one day, and was able to concentrate on one " "thing and felt better after a few minutes. Patient has been able to identify triggers which has helped.     Patient 20th wedding anniversary is 8/7/24 and plan to go on a cruise though not scheduled yet.  Kids are going to be at different schools this year and is uncertain of their schedules.     3/4/24:   Answers submitted by the patient for this visit:  Primary Reason for Visit (Submitted on 3/3/2024)  What is the primary reason for your visit?: Other  Other (Submitted on 3/3/2024)  Please describe your symptoms.: Depression  Have you had these symptoms before?: Yes  How long have you been having these symptoms?: Greater than 2 weeks    Patient presents today in office.  No longer taking Hydroxyzine as patient has not had any panic attacks. Patient reports in Jan. 2024 had 2 days of feeling anxious though easily subsided.  Patient noticed while in conversation with other co-workers will forget words or names, even if family members, cannot recall name but can visualize their face, it used to happen minimally though has noticed more often the last few months.  Used to occur once a month in the past, now happening 5-6 times per day.  \"My mind will go blank for that one word.\"   Patient reports sleeping well with BiPap, able to wake up before alarm for the last 4 months, and is ready to get up and proceed with day. Patient denies other related memory problems.    Patient feels work and family load has lightened up, has a third of the stress he used to have in the summer time.   Patient had flu A, flu B, and Covid this year which were treated in [facility], believes was seen 2/6/24 and in 1/2024.   Patient recalls having difficulty in Dec before transmission of COVID, has had Covid (March 2023)a few times though minimal symptoms, coughs and chills. Patient is due for annual PCP visit and plans to get labs and see provider in the next few weeks.   Years ago would have difficulty if had to speak in front of large crowd, though " "otherwise has not been occurring until more frequently the last few months.     Depression and anxiety are fairly well controlled, denies new stressors, anxiousness, life changes.  Denies other memory difficulties.     10/30/23:    Answers submitted by the patient for this visit:  Primary Reason for Visit (Submitted on 10/23/2023)  What is the primary reason for your visit?: Other  Other (Submitted on 10/23/2023)  Please describe your symptoms.: Depression  Have you had these symptoms before?: Yes  How long have you been having these symptoms?: Greater than 2 weeks    Patient presents today in office \"everything is working very well. The last month has had more stressors.  As father got COVID, was in the hospital, suffered pneumonia, and heart attack, and was able to go home and passed away at home after all children and grandchildren visited, and passed away after being home 3 days.  Family thought he was doing well and didn't have hosparus after discharge.  Patient father had a stroke before in July and had minimal movement to right side, though when had COVID father declined further.  Overall patient feels he is coping well.      Reports continues to take Hydroxyzine 25 mg once daily though during  did take one before proceedings which helped. Patient feels Cymbalta has been effective, blood pressure today lower than past, overall feels anxiety and depression are well controlled.  Denies further panic attacks.    Depression: Patient complains of depression. He complains of depressed mood, difficulty concentrating, fatigue, feelings of worthlessness/guilt, insomnia, and weight gain  Anxiety:  The patient endorses significant symptoms of anxiety including: restlessness or feeling keyed up, being easily fatigued, difficulty concentrating or mind going blank, irritability, and sleep disturbance which have caused impairment in important areas of daily functioning.     23:   Answers submitted by the patient " "for this visit:  Primary Reason for Visit (Submitted on 7/10/2023)  What is the primary reason for your visit?: Other  Other (Submitted on 7/10/2023)  Please describe your symptoms.: Depression  Have you had these symptoms before?: Yes  How long have you been having these symptoms?: Greater than 2 weeks    Patient presents today in office with a blood pressure 164/104 today, reports wife has been checking at home at least 2 times per week 130's/80's. 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  Denies headaches, chest pain, soa, dizziness, nor sweating.     Instructed patient to continue 90 mg of Cymbalta in the morning and add 30 mg at night or he may take in the evening on 6/8/23 at last appointment, which patient reports tolerating well, noted effectiveness.    Patient went on vacation last week with family and received positive feedback, \"I was more involved in conversation. I think there's been a big improvement, I haven't had any sadness.\" Patient also attended a graduation and was more engaged.     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 Mother is now back home. Family has alternated staying with mother.  Health concerns with father has been stressful.     Patient continues to take daily morning Hydroxyzine, has not required other use, keeps supply in truck, though has not needed. Denies panic attacks.     Recheck blood pressure manually to right arm 152/84, hr 94    5/30/23:  Patient presents today in office, \"The first 3 weeks coming off that medicine I had angry spells, up and down, but the last 2-3 days I have been crying for no reason.\" Patient was taken off Zoloft 50 mg at last visit due to also taking Cymbalta 90 mg.     At " "last visit Hydroxyzine (Vistaril) was changed from 25 mg by mouth daily TO Hydroxyzine (Atarax) 25 mg by mouth 3 times daily as needed to target anxiety, panic attacks due to increased risks of sedation with Vistaril.  Patient was also instructed patient to start keeping a small supply of Hydroxyzine in truck instead of Klonopin, which patient reported rare use.  Advised to no longer take Klonopin if Hydroxyzine effective along with coping strategies discussed today.   Patient has not required use as as needed, and takes daily in the morning,and keeps extra in vehicle which patient has not had to take.  Denies drowsiness.     Panic attacks: denies  Patient weight noted with a decrease of 7 lbs, however, patient reports weight fluctuates often, and believes prior weight may have been up due to work clothes and pockets with items.     Patient no longer having anger spells, currently feelings of emptiness. Denies external stressors or changes.  This past Saturday went out with family, and Sunday night went to a concert, which patient found guillermo.  \"It comes out of nowhere, it hits me all at once.\"  Patient will try to distract self rather than dwell, though sometimes unable to distract.  Reports more sadness, hopeless feeling, and starts to cry, rather than anxiousness.        4/18/23:  INITIAL EVAL  Patient presents today in office with a history of anxiety and depression for which treatment began approximately 20 yrs ago.  Patient is currently prescribed Cymbalta 90 mg, Zoloft 50 mg, and Hydroxyzine (Vistaril) 25 mg daily, which have provided effectiveness.  Patient has a PMHX of Hypertension, Migraines, ANGELICA with use of CPAP, hypogonadism, ED, prostate disorder, Arthritis, gout, lumbar back surgery, peripheral neuropathy (BLE) and allergic rhinitis.    Patient goal of treatment \"just to feel somewhat normal, and have some emotion, right now I feel I am just here.\"    Patient was with an [facility] provider for approximately " "1.5 yrs, last seen via video approximately 1 month ago, was not happy with care due to frequent medication changes.  As patient reports taking 4 different medications at the same time, and is unable to determine their effectiveness.     Patient reports having thoughts of a truck hitting him while driving, feels worthless at times.  Denies suicidal planning, thoughts of action plan, or rumination.  \"That family would be better off dead, negative thoughts start to spiral through, and over thinks meaning how much better would my family be if this truck lost control.\"  Once patient is around his 3 kids or is busy at work he is able to alleviate negative thoughts.  These thoughts have occurred once every several months.      Wife tells patient he is emotionless, which has been going on for approximately 6-8 months.  Patient recalls while family was bowling, family was having fun, showing emotion, though patient had no expression or emotion to surroundings.      Patient is certain he is taking Cymbalta 90 mg in the morning, Hydroxyzine 25 mg daily in the morning is allowed to take up to 3 times daily,  Zoloft 50 mg in the morning.  The Klonopin 0.5 mg was filled 22 for 15 tabs, and last took yesterday due to elevated stress and prior was at least 2-3 months.  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      In regards to anxiety, \"it is out of nowhere\", will feel discomfort to chest, anxiousness, feeling soa, which is typically felt after arrival to work which is approximately 10 minutes after arising. Though has felt symptoms in the past without being work related.  Patient unable to identify specific triggers of anxiety.  Patient will try to calm self by listening to music alone in truck, distractions which seems to help if anxiety is not as bad.   And " if not relieved will only take half of the Klonopin 0.5 mg which is effective, as patient try's to not take unless absolutely necessary. Patient believes he has 8-9 tabs of Klonopin remaining.  Patient denies use of as needed Hydroxyzine in place of Klonopin in the past.     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 Patient does suffer from arthritis. At times patient does experience blurred vision, which is intermittent. Denies use of prescription lenses or contacts or recent eye exam. Left hand does shake with fine motor skills such as writing, which patient recently noticed over the last few months.     Cymbalta for approximately 3 yrs, had been effective, however, per pharmacy record patient provided today, dose was increased from 60 mg to 90 mg 8/30/22.  And Zoloft 50 mg started 9/19/22.  Nerve pain to ble has been managed well with Cymbalta.  Patient occasionally takes another antihistamine a few weeks ago due to allergy season, otherwise does not take routinely or past start of spring.  Denies difficulty with sleep since starting using CPAP.  Denies day time drowsiness.  Confirmed patient is no longer taking Prozac, Rexulti, Buspar, or Effexor XR.      Depression: Patient complains of depression. He complains of anhedonia, depressed mood, difficulty concentrating, feelings of worthlessness/guilt and appetite changes.         Anxiety: The patient endorses significant symptoms of anxiety including:  anxioiusness, feeling on edge, , restlessness or feeling keyed up, being easily fatigued, difficulty concentrating or mind going blank, irritability, muscle tension and sleep disturbance which have caused impairment in important areas of daily functioning.  The patient has had symptoms of anxiety for over 20 yrs, which have worsened over the last 8-9 months.       PHQ-9 Depression Screening  PHQ-9 Total Score:   12/9/2024 10   The PHQ  has not been completed during this encounter.         BERNICE-7    12/9/2024 11    The following portions of the patient's history were reviewed and updated as appropriate: allergies, current medications, past family history, past medical history, past social history, and past surgical history.     Past Surgical History:  Past Surgical History:   Procedure Laterality Date    BACK SURGERY      x 2       Problem List:  Patient Active Problem List   Diagnosis    Anxiety    Depressive disorder    History of colonic polyps    Hyperglycemia    Hyperlipidemia    Low back pain    Pain in soft tissues of limb    Thoracic spondylosis with myelopathy       Allergy:   No Known Allergies     Discontinued Medications:  Medications Discontinued During This Encounter   Medication Reason    DULoxetine (CYMBALTA) 30 MG capsule Reorder             Current Medications:   Current Outpatient Medications   Medication Sig Dispense Refill    atorvastatin (LIPITOR) 20 MG tablet Take 1 tablet by mouth Daily.      DULoxetine (CYMBALTA) 30 MG capsule Take 3 capsules by mouth Every Morning AND 1 capsule Every Night. Indications: Generalized Anxiety Disorder, Major Depressive Disorder 360 capsule 3    hydrOXYzine (ATARAX) 25 MG tablet Take 1 tablet by mouth 3 (Three) Times a Day As Needed for Anxiety (panic attacks). DO NOT take any other Antihistamine or Allergy medication within 4 hrs of taking Hydroxyzine. MAY INCREASE TO 50 MG IF 37.5 MG NOT EFFECTIVE & WITHOUT DROWSINESS  Indications: Feeling Anxious 180 tablet 0    lisinopril (PRINIVIL,ZESTRIL) 10 MG tablet Take 1 tablet by mouth Daily.      tamsulosin (FLOMAX) 0.4 MG capsule 24 hr capsule Take 1 capsule by mouth Daily.      Testosterone Cypionate (DEPOTESTOTERONE CYPIONATE) 200 MG/ML injection Inject 1 mL into the appropriate muscle as directed by prescriber.      aspirin 81 MG EC tablet Take 1 tablet by mouth. (Patient not taking: Reported on 9/9/2024)      cyclobenzaprine (FLEXERIL) 10 MG  "tablet One po qhs for muscle spasm. (Patient not taking: Reported on 9/9/2024)       No current facility-administered medications for this visit.       Past Medical History:  Past Medical History:   Diagnosis Date    Anxiety     Chronic pain disorder     Depression     Obsessive-compulsive disorder     Panic disorder     Peripheral neuropathy        Past Psychiatric History:  Began Treatment: 20 yrs ago prior to getting , though dealt with symptoms since childhood  Diagnoses:Depression, Anxiety and OCD, panic disorder  Psychiatrist: DELMY Alamo with [facility] 1.5 yrs, last seen 3/2022  Therapist: 2- one had about 6 visits approx 15 yrs ago; and 8 yrs ago seen a lady in [place]  Admission History:Denies  Medication Trials: several changes while at [facility], \"certain one's i took in the morning and i couldn't get out of truck, like i was froze, worrying\"added several meds to Cymbalta; Effexor XR, Buspar was unable to keep up with overlapping med orders; Rexulti, Prozac, and Zoloft (9/2022-4/18/23 stopped due to risk of serotonin syndrome wihile taking with Cymbalta)  Hydroxyzine-effective  Self Harm: Denies  Suicide Attempts:Denies      Substance Abuse History:   Types:Denies all, including illicit  Withdrawal Symptoms:Denies  Longest Period Sober:Not Applicable   AA: Not applicable     Social History:  Martial Status:  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, where [email or web address]   Kids:Yes or If so, how many 3- age 13,10, and 7 as of 4/2023  House:Lives in a house   History: Denies  Access to Guns:  Yes, locked in safe    Social History     Socioeconomic History    Marital status:     Number of children: 3   Tobacco Use    Smoking status: Never    Smokeless tobacco: Current     Types: Snuff   Vaping Use    Vaping status: Never Used   Substance and Sexual Activity    Drug use: Never     Comment: only anything ever prescribed (but did one time fail drug test due to CBD drops not using " currently)    Sexual activity: Yes       Family History:   Suicide Attempts: Denies  Suicide Completions:Denies      Family History   Problem Relation Age of Onset    Depression Mother     Anxiety disorder Mother     Alcohol abuse Father     Alcohol abuse Sister     Alcohol abuse Brother        Developmental History:   Born: [place]  Siblings:3 sisters, 1 brother  Childhood: Denies Abuse  High School:Completed  College: [employer] degree    Mental Status Exam:   Hygiene:   good  Cooperation:  Cooperative  Eye Contact:  Good  Psychomotor Behavior:  Appropriate  Affect:  Appropriate  Mood: euthymic    Speech:  Normal  Thought Process:  Goal directed  Thought Content:  Mood congruent  Suicidal:  None  Homicidal:  None  Hallucinations:  None  Delusion:  None  Memory:  Intact  Orientation:  Grossly intact  Reliability:  good  Insight:  Good  Judgement:  Good  Impulse Control:  Good  Physical/Medical Issues:  Yes Hypertension, Migraines, ANGELICA with CPAP, peripheral neuropathy, ED, Hypogonadism, prostate disorder, Arthritis, gout, lumbar back surgery, and allergic rhinitis.      Review of Systems:  Review of Systems   Constitutional:  Positive for unexpected weight change. Negative for fatigue.        Attributes to testosterone   HENT:  Negative for trouble swallowing.    Respiratory:  Positive for apnea. Negative for cough and shortness of breath.         BiPap   Cardiovascular:  Negative for chest pain and palpitations.   Gastrointestinal:  Negative for diarrhea and nausea.   Musculoskeletal:  Positive for arthralgias and back pain.   Neurological:  Positive for numbness. Negative for dizziness and seizures.        Ble peripheral neuropathy   Psychiatric/Behavioral:  Negative for agitation, confusion, decreased concentration, hallucinations, self-injury, sleep disturbance and suicidal ideas. The patient is not nervous/anxious and is not hyperactive.          Physical Exam:  Physical Exam  Psychiatric:          "Attention and Perception: Attention and perception normal.         Mood and Affect: Mood and affect normal.         Speech: Speech normal.         Behavior: Behavior normal. Behavior is cooperative.         Thought Content: Thought content normal. Thought content does not include suicidal ideation. Thought content does not include suicidal plan.         Cognition and Memory: Cognition and memory normal.         Judgment: Judgment normal.         Vital Signs:   /80   Pulse (!) 124   Ht 190.5 cm (75\")   Wt (!) 143 kg (314 lb 9.6 oz)   BMI 39.32 kg/m²          Lab Results:   Lab Results   Component Value Date   WBC 8.2 11/08/2024   RBC 4.89 11/08/2024   HEMOGLOBIN 14.5 11/08/2024   HEMATOCRIT 42.4 11/08/2024   PLATELETS 308 11/08/2024   MCV 87 11/08/2024   MCH 29.7 11/08/2024     Lab Results   Component Value Date   GLUCOSESERU 98 11/08/2024   BUN 14 11/08/2024   CREATININES 1.00 11/08/2024   BUNCREATINI 14 11/08/2024   EGFR 94 11/08/2024   SODIUMSERUM 140 11/08/2024   POTASSIUMSE 4.4 11/08/2024   CALCIUMSERU 10.1 11/08/2024   CHLORIDESER 101 11/08/2024   CARBONDIOXID 25 11/08/2024   BILIRUBINTO 0.5 11/08/2024   ALKALINEPHOS 100 11/08/2024   ASTSGOT 27 11/08/2024   ALTSGPT 25 11/08/2024     Lab Results   Component Value Date   CHOL 196 11/08/2024   TRIG 182 (H) 11/08/2024   HDL 35 (L) 11/08/2024   LDLCALC 128 (H) 11/08/2024   LABVLDL 33 11/08/2024     Lab Results   Component Value Date   TSH 0.468 11/08/2024     Lab Results   Component Value Date   PROSTATESPEC 0.4 11/08/2024     No visits with results within 6 Month(s) from this visit.   Latest known visit with results is:   No results found for any previous visit.       EKG Results:  No orders to display       Imaging Results:  No Images in the past 120 days found..      Assessment & Plan   Diagnoses and all orders for this visit:    1. Generalized anxiety disorder with panic attacks (Primary)  -     DULoxetine (CYMBALTA) 30 MG capsule; Take 3 capsules by " mouth Every Morning AND 1 capsule Every Night. Indications: Generalized Anxiety Disorder, Major Depressive Disorder  Dispense: 360 capsule; Refill: 3    2. Recurrent major depressive disorder, in partial remission  -     DULoxetine (CYMBALTA) 30 MG capsule; Take 3 capsules by mouth Every Morning AND 1 capsule Every Night. Indications: Generalized Anxiety Disorder, Major Depressive Disorder  Dispense: 360 capsule; Refill: 3    3. Medication management    4. Medication care plan discussed with patient            Visit Diagnoses:    ICD-10-CM ICD-9-CM   1. Generalized anxiety disorder with panic attacks  F41.1 300.02    F41.0 300.01   2. Recurrent major depressive disorder, in partial remission  F33.41 296.35   3. Medication management  Z79.899 V58.69   4. Medication care plan discussed with patient  Z71.89 V65.49             PLAN:  Safety: No acute safety concerns  Therapy: Declines   Risk Assessment: Risk of self-harm acutely is moderate.  Risk factors include anxiety disorder, mood disorder, access to guns/weapons.  Protective factors include no family history, no present SI, no history of suicide attempts or self-harm in the past, minimal AODA, healthcare seeking, future orientation, willingness to engage in care.  Risk of self-harm chronically is also moderate, but could be further elevated in the event of treatment noncompliance and/or AODA.  Meds:  -Continue Cymbalta (Duloxetine) 90 mg by mouth daily in the morning AND 30 mg nightly to target depression and anxiety as well as peripheral neuropathy.  Refilled today for 90 days with 3 refills.  -Continue Hydroxyzine 25 mg by mouth 3 times daily as needed to target anxiety.     5. Labs: n/a    Symptoms of anxiety, panic attacks, depression are under good control with current medication regimen.    The plan was discussed with the patient. The patient was given time to ask questions and these questions were answered. At the conclusion of their visit they had no  additional questions or concerns and all questions were answered to their satisfaction.   Patient was given instructions and counseling regarding condition and for health maintenance advice. Please see specific information pulled into the AVS if appropriate.    Patient to contact provider if symptoms worsen or fail to improve.      12/9/24:   -Continue Cymbalta (Duloxetine) 90 mg by mouth daily in the morning AND 30 mg nightly to target depression and anxiety as well as peripheral neuropathy.    -Continue Hydroxyzine 25 mg by mouth 3 times daily as needed to target anxiety. Refilled today for 90 days due to minimal use reported, and decreased to only 25 mg.     Symptoms of anxiety, depression are under good control with current medication regimen. Suspect patient is experiencing an adverse drug reaction to recent start of Testosterone almost 4 weeks ago, advised patient to inform PCP of mood changes before next injection is due later this week, as time line of symptoms coincide with start of Testosterone and discussed potential mood changes today in regards to this medication.     The plan was discussed with the patient. The patient was given time to ask questions and these questions were answered. At the conclusion of their visit they had no additional questions or concerns and all questions were answered to their satisfaction.   Patient was given instructions and counseling regarding condition and for health maintenance advice. Please see specific information pulled into the AVS if appropriate.    Patient to contact provider if symptoms worsen or fail to improve.      9/9/24:   -Continue Cymbalta (Duloxetine) 90 mg by mouth daily in the morning AND 30 mg nightly to target depression and anxiety as well as peripheral neuropathy.    -Continue Hydroxyzine 25-50 mg by mouth 3 times daily as needed to target anxiety. Has not required in the last 3 weeks. No refills needed at this time.     Symptoms of anxiety,  "depression are under good control with current medication regimen.  Elevated blood pressure and heart rate related to chronic back pain. Overall mood is stable.   The plan was discussed with the patient. The patient was given time to ask questions and these questions were answered. At the conclusion of their visit they had no additional questions or concerns and all questions were answered to their satisfaction.   Patient was given instructions and counseling regarding condition and for health maintenance advice. Please see specific information pulled into the AVS if appropriate.    Patient to contact provider if symptoms worsen or fail to improve.        6/21/24:  Telephone  -Patient called to advise that the medication you gave him around June 11,2024 seem to be working but here the past couple of days it is Not Working.  Patient advises he left work yesterday and then today when he was getting ready to go to work he was \"just BLAH\" and could not go.  Patient is requesting a callback.      -Returned call to patient regarding Hydroxyzine no longer working, patient reports started to feel better last week did not have any anxiety, and yesterday while driving to work, \"felt very anxious, almost like a fear, I don't know why, I froze\" and turned around and came back home, and this morning while getting ready to go to work the same feeling came over him, and did not go to work today.  Reports taking Hydroxyzine 25 mg in the morning, at lunch, and at 7 pm, and did not feel feel drowsy.      Instructed patient to start taking 37.5 mg (1 and a half tablets of the 25 mg) by mouth 3 times daily as needed for anxiety, panic attacks, and if ineffective and not causing drowsiness, may increase further to 50 mg (2 tabs) 3 times daily as needed.  Patient instructed to contact provider if symptoms worsen or fail to improve, and if he is unable to go to work on Monday to contact provider so he can be scheduled for an appointment. " "Will provide a work note for today. Patient verbalized understanding and agreeable with plan.     6/11/24:  TELEPHONE  Returned patient's call to further inquire. Patient advises \"I about had a mental breakdown last night,panic attack and crying almost to the point where I thought I was going crazy!\" I asked patient if he felt suicidal or homicidal and advised him if that ever becomes the case to go to the Emergency Room to which patient replied \"Yes.\"  I asked patient if he knew what might have brought it on?  Patient said \"I was off work yesterday due to my back going out. And just the thought of going back to work and the stressors there I just lost it.\"  I told patient I would have Luz Elena call him or work him in somewhere today.  Patient verbalized understanding.    -Returned call to patient and patient reports having a mental breakdown last night, \"I felt like I was going crazy, heart was racing, chest was hurting.\" Yesterday patient was off work due to back problem, and was going to return today though upon the thought of returning to work last night began to experience symptoms.  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 Patient feels the anxiety was sudden.   Denies SI/HI, though feels worthless, \"like I don't have any value.\" Patient is agreeable to restart Hydroxyzine as it was effective in the past.  Patient instructed to contact provider no later than this Friday if symptoms worsen or fail to improve. Patient verbalized understanding.     Start Hydroxyzine 25 mg by mouth 3 times daily as needed to target anxiety, panic attacks.  Due to potential drowsiness, instructed patient to not drive, use machinery, or do anything that needs mental alertness until you know how this medication affects you.    Instructed to not take any other Antihistamine/Allergy medication such as but not limited to:  Zyrtec, Claritin, Allegra, or Benadryl within 4 hrs of taking " Hydroxyzine.  Risks, benefits, alternatives discussed with patient including sedation, dizziness, fall risk, GI upset, and risk of increased CNS depression and elevated heart rate if taken with other antihistamines.  After discussion of these risks and benefits, the patient voiced understanding and agreed to proceed.  Patient did tolerate well in the past without reported drowsiness. New prescription sent to [facility] [place].     5/6/24:   -Continue Cymbalta (Duloxetine) 90 mg by mouth daily in the morning AND 30 mg nightly to target depression and anxiety as well as peripheral neuropathy.   Patient instructed to request refills when appropriate, when down to 5-7 days remaining via  pharmacy or via [employer].       Symptoms of anxiety, depression are under good control with current medication regimen.    Patient was given instructions and counseling regarding condition and for health maintenance advice. Please see specific information pulled into the AVS if appropriate.    Patient to contact provider if symptoms worsen or fail to improve.        3/4/24:   -Continue Cymbalta (Duloxetine) 90 mg by mouth daily in the morning AND 30 mg nightly to target depression and anxiety as well as peripheral neuropathy.   -Removed Hydroxyzine (Atarax) as patient no longer taking.     Symptoms of anxiety, depression are under good control with current medication regimen.  New symptoms of difficulty with word finding while in conversations in random settings which has worsened over time, etiology is uncertain.  Patient compliant with BiPap, sleeping well, has been on current dose of Cymbalta since 6/2023, denies anxiousness, recent bouts of flu and Covid are potential reasons, however, duration of difficulty with word finding began beforehand.  Unable to locate specific dates of viral illness, advised patient to have routine labs with upcoming appointment with PCP for annual well visit.  And patient is to discuss further with PCP, and to  contact provider if any unknown findings are found.  Patient will need annual labs including but not limited to , CBC, CMP, Thyroid panel with TSH, Hepatic Profile, Lipid profile, Iron Profile, Vitamin D-25 hydroxy, Vitamin B-12, Folate, and others the PCP may advise.   Patient was given instructions and counseling regarding condition and for health maintenance advice. Please see specific information pulled into the AVS if appropriate.    Patient to contact provider if symptoms worsen or fail to improve.      10/30/23:  -Continue Cymbalta (Duloxetine) 90 mg by mouth daily in the morning AND 30 mg nightly to target depression and anxiety as well as peripheral neuropathy. Refilled today 90 days with 2 refills.   -Continue Hydroxyzine (Atarax) 25 mg by mouth 3 times daily as needed to target anxiety, panic attacks.     Symptoms of anxiety, depression are under good control with current medication regimen.    Patient was given instructions and counseling regarding condition and for health maintenance advice. Please see specific information pulled into the AVS if appropriate.    Patient to contact provider if symptoms worsen or fail to improve.        7/31/23:   Therapy: Declines due to inconsistent work schedule  -Continue Cymbalta (Duloxetine) 90 mg by mouth daily in the morning AND 30 mg nightly to target depression and anxiety as well as peripheral neuropathy.  NR needed today  -Continue Hydroxyzine (Atarax) 25 mg by mouth 3 times daily as needed to target anxiety, panic attacks. Instructed to not take any other Antihistamine/Allergy medication such as but not limited to:  Zyrtec, Claritin, Allegra, or Benadryl within 4 hrs of taking Hydroxyzine.  Denies drowsiness with once daily dose every morning, has not required more than once daily.  -Patient instructed to request refill when appropriate from pharmacy or via [employer]. Cymbalta prescription will end 9/7/23, may request up to 7 days prior.     Symptoms of anxiety  "with panic attacks and depression are under good control with current medication regimen.  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   Patient was given instructions and counseling regarding condition and for health maintenance advice. Please see specific information pulled into the AVS if appropriate.    Patient to contact provider if symptoms worsen or fail to improve.      6/8/23: TELEPHONE  Patient called back today.  Patient said the entire day yesterday he was crying and sleeping.  Patient reports today he is not crying but still very depressed \"feels like I have a pit in my stomach and I feel very anxious.\" I explained to patient that if he is having SI to immediately to go to the ER so he may be evaluated\" Patient voiced understanding.  I asked patient if he would like me to send the message to a Provider who is in the [place] office patient stated No just send a message to Luz Elena and have her call me later.\" Patient denies plans to act on the SI.   PLEASE CALL.    Returned call to patient as requested patient reports this past Wed. Had a horrible day, and the last 2 days has been feeling better, no longer crying, still has knot in stomach, was worried due to having SI Tues night until Wed. \"It just crossed my mind, I wasn't acting on it or anything.\" Explains waking up Wed. Not feeling right, had to drive to [place] for a job and cried the entire way and ended up not being able to work and went to mom's and dad's and slept the rest of the day.  In regards to blood pressure, patient reports wife has checked and readings have been 124/84. And checks every 2 days. Patient is agreeable to add Cymbalta 30 mg at night and have wife continue to check blood pressure, " "patient reports having extra Cymbalta 30 mg on hand.  Instructed patient to continue 90 mg of Cymbalta in the morning and add 30 mg at night or he may take in the evening, patient verbalized understanding, updated order to reflect accuracy in EHR as a NO PRINT.  Patient to contact provider if symptoms worsen or fail to improve.  And if blood pressure readings elevate.     6/7/23: TELEPHONE  Patient LMVM that his depression has \"really increased and I am beginning to have some SI.  Please advise  Tried returning call to patient but phone continually rang no answer and no voicemail option.    5/30/23:   -Continue Cymbalta (Duloxetine) 90 mg by mouth daily in the morning to target depression and anxiety as well as peripheral neuropathy.    -Continue Hydroxyzine (Atarax) 25 mg by mouth 3 times daily as needed to target anxiety, panic attacks. Denies drowsiness with once daily dose every morning, has not required more than once daily.  Patient instructed to Start tracking symptoms with date, time, symptoms experiencing, potential cause-what were you doing at the time of the sudden symptoms you experienced if you can recall.  How were you able to stop symptoms? And have wife check blood pressure at least once weekly.     Symptoms of anxiety, panic attacks, and depression are under fair control with current medication regimen.  Due to duration of current symptoms expressed today and elevated blood pressure, will have patient start tracking symptoms, have wife check blood pressure at least once weekly and report with next visit.  Patient advised to contact provider if symptoms are occurring more often than not, as we discussed adding Cymbalta 30 mg nightly, though would like to see blood pressure more stable and symptom tracking before proceeding, as patient was informed if blood pressure did continue to elevate would have to stop the 30 mg dose.   Patient to contact provider if symptoms worsen or fail to improve. "       4/18/23:   Safety: No acute safety concerns  Therapy: Declines due to inconsistent work schedule  Continue Cymbalta (Duloxetine) 90 mg by mouth daily in the morning to target depression and anxiety as well as peripheral neuropathy.   Discussed all risks, benefits, alternatives, and side effects of Duloxetine (Cymbalta) including but not limited to GI upset, sexual dysfunction, bleeding risk, seizure risk, weight loss, insomnia, diaphoresis, drowsiness, headache, dizziness, fatigue, activation of kimmie or hypomania, increased fragility fracture risk, hyponatremia, increased BP, hepatotoxicity, ocular effects, withdrawal syndrome following abrupt discontinuation, serotonin syndrome, and activation of suicidal ideation and behavior.   Discussed the need for patient to immediately call the office for any new or worsening symptoms, such as worsening depression; feeling nervous or restless; suicidal thoughts or actions; or other changes in mood or behavior, and all other concerns. Patient educated on medication compliance and the risks of suddenly stopping this medication or missing doses. Patient verbalized understanding and is agreeable to taking Duloxetine. Addressed all questions and concerns.    Change Hydroxyzine (Vistaril) 25 mg by mouth daily TO Hydroxyzine (Atarax) 25 mg by mouth 3 times daily as needed to target anxiety, panic attacks due to increased risks of sedation with Vistaril.  Instructed to not take any other Antihistamine/Allergy medication such as but not limited to:  Zyrtec, Claritin, Allegra, or Benadryl within 4 hrs of taking Hydroxyzine.  Risks, benefits, alternatives discussed with patient including sedation, dizziness, fall risk, GI upset, and risk of increased CNS depression and elevated heart rate if taken with other antihistamines.  After discussion of these risks and benefits, the patient voiced understanding and agreed to proceed. Instructed patient to start keeping a small supply of  Hydroxyzine in truck instead of Klonopin, which patient reported rare use.  Advised to no longer take Klonopin if Hydroxyzine effective along with coping strategies discussed today.     Stop Zoloft due to risks of serotonin syndrome in combination with Cymbalta, informed patient he may experience increased irritability and GI discomfort which should subside in a few days. If symptoms fail to improve contact provider.     Removed Prozac from medication profile as patient has not taken for some time.     Patient instructed to bring AVS with current medication list to pharmacy upon picking up Cymbalta and Hydroxyzine for accuracy. Highlighted area to show pharmacy.   Coping mechanisms discussed with patient today as a way to gain control over feelings to prevent situation or panic attack from getting worse: grounding techniques using 5 senses (name 3 things you can see, smell, touch, etc.), slow paced breathing techniques- focus on door inhaling and exhaling at each corner, application of cold compress/ice pack/water on face or opening freezer door to allow cold air to be breathed in taking slow deep breaths, closing eyes and focus on positive thoughts.    Patient presentation seems most consistent with depression, anxiety, and panic attacks.  Reviewed medication dispense record from [facility] pharmacy 1/1/21-12/31/22, reconciled medications.   Patient to contact provider if symptoms worsen or fail to improve.      Patient screened positive for depression based on a PHQ-9 score of 10 on 12/9/2024. Follow-up recommendations include: Prescribed antidepressant medication treatment and Suicide Risk Assessment performed.       TREATMENT PLAN/GOALS: Continue supportive psychotherapy efforts and medications as indicated. Treatment and medication options discussed during today's visit. Patient acknowledged and verbally consented to continue with current treatment plan and was educated on the importance of compliance with  treatment and follow-up appointments.    MEDICATION ISSUES:  HANSEL reviewed as expected.  Discussed medication options and treatment plan of prescribed medication as well as the risks, benefits, and side effects including potential falls, possible impaired driving and metabolic adversities among others. Patient is agreeable to call the office with any worsening of symptoms or onset of side effects. Patient is agreeable to call 911 or go to the nearest ER should he/she begin having SI/HI. No medication side effects or related complaints today.     MEDS ORDERED DURING VISIT:  New Medications Ordered This Visit   Medications    DULoxetine (CYMBALTA) 30 MG capsule     Sig: Take 3 capsules by mouth Every Morning AND 1 capsule Every Night. Indications: Generalized Anxiety Disorder, Major Depressive Disorder     Dispense:  360 capsule     Refill:  3       Return in about 6 months (around 9/11/2025) for medication check.         I spent 25 minutes caring for Naldo on this date of service. This time includes time spent by me in the following activities: preparing for the visit, performing a medically appropriate examination and/or evaluation, counseling and educating the patient/family/caregiver, ordering medications, tests, or procedures, referring and communicating with other health care professionals, documenting information in the medical record, care coordination, and scheduling .     This document has been electronically signed by DELMY Rodriguez  March 11, 2025 16:29 EDT      Part of this note may be an electronic transcription/translation of spoken language to printed text using the Dragon Dictation System.

## 2025-03-11 NOTE — PATIENT INSTRUCTIONS
"Should you want to get in touch with your provider, DELMY Rodriguez, please contact MY Medical Assistant, Carlita, directly at [phone].  Recommend saving Carlita's direct number in phone as this is the PREFERRED & EASIEST way to get in contact with your provider.  Please leave a voice mail if you do not get an answer and she will return your call within 24 hrs. You will NOT be able to contact provider on [employer], as Behavioral Health Providers are restricted. YOU MUST CALL [phone]  If you need to speak with the on call provider after hours or on weekends, please Contact the [facility] ([phone]) and staff will be able to page the provider on call directly.     SPECIFIC RECOMMENDATIONS:     1.      Medications discussed at this encounter:                   -  Refilled Cymbalta for 90 days with 3 refills.     2.      Psychotherapy recommendations: Declined     3.     Return to clinic: 6 months Tues. 9/9/25 at 4pm     Please arrive at least 15 minutes before your scheduled appointment time to complete check in process.      IF you are scheduled for a [employer] V[employer] visit, YOU MUST COMPLETE THE \"E-CHECK IN\" PROCESS PRIOR TO BEGINNING THE VISIT, You may still complete the E-Check in for in office visits prior to appointment, you will receive multiple text/email reminders which will direct you further if needed.            "